# Patient Record
Sex: MALE | Race: ASIAN | NOT HISPANIC OR LATINO | Employment: FULL TIME | URBAN - METROPOLITAN AREA
[De-identification: names, ages, dates, MRNs, and addresses within clinical notes are randomized per-mention and may not be internally consistent; named-entity substitution may affect disease eponyms.]

---

## 2017-10-26 ENCOUNTER — OFFICE VISIT (OUTPATIENT)
Dept: PEDIATRICS | Facility: CLINIC | Age: 42
End: 2017-10-26
Payer: COMMERCIAL

## 2017-10-26 VITALS
SYSTOLIC BLOOD PRESSURE: 110 MMHG | HEART RATE: 81 BPM | BODY MASS INDEX: 24.91 KG/M2 | OXYGEN SATURATION: 100 % | TEMPERATURE: 97.2 F | HEIGHT: 66 IN | WEIGHT: 155 LBS | DIASTOLIC BLOOD PRESSURE: 84 MMHG

## 2017-10-26 DIAGNOSIS — Z76.89 ENCOUNTER TO ESTABLISH CARE: Primary | ICD-10-CM

## 2017-10-26 DIAGNOSIS — Z13.220 LIPID SCREENING: ICD-10-CM

## 2017-10-26 DIAGNOSIS — Z23 NEEDS FLU SHOT: ICD-10-CM

## 2017-10-26 DIAGNOSIS — M54.2 CERVICALGIA: ICD-10-CM

## 2017-10-26 DIAGNOSIS — Z13.1 SCREENING FOR DIABETES MELLITUS: ICD-10-CM

## 2017-10-26 PROCEDURE — 90686 IIV4 VACC NO PRSV 0.5 ML IM: CPT | Performed by: INTERNAL MEDICINE

## 2017-10-26 PROCEDURE — 90471 IMMUNIZATION ADMIN: CPT | Performed by: INTERNAL MEDICINE

## 2017-10-26 PROCEDURE — 99202 OFFICE O/P NEW SF 15 MIN: CPT | Mod: 25 | Performed by: INTERNAL MEDICINE

## 2017-10-26 NOTE — PATIENT INSTRUCTIONS
Make appointment(s) for:   -- check with your insurance about coverage for physical, lab, physical therapy.   -- schedule for these appointments if they are covered benefits.   -- stop taking B12 supplement.  -- please bring your immunization record for review at your next appointment.

## 2017-10-26 NOTE — MR AVS SNAPSHOT
After Visit Summary   10/26/2017    Alvarado Spivey    MRN: [id number]           Patient Information     Date Of Birth          1975        Visit Information        Provider Department      10/26/2017 5:30 PM Ilia Rodriguez MD PhD [facility]        Today's Diagnoses     Encounter to establish care    -  1    Cervicalgia        Needs flu shot        Lipid screening        Screening for diabetes mellitus          Care Instructions    Make appointment(s) for:   -- check with your insurance about coverage for physical, lab, physical therapy.   -- schedule for these appointments if they are covered benefits.   -- stop taking B12 supplement.  -- please bring your immunization record for review at your next appointment.                 Follow-ups after your visit        Additional Services     [facility] PT, HAND, AND CHIROPRACTIC REFERRAL       **This order will print in the [facility] Scheduling Office**    Physical Therapy, Hand Therapy and Chiropractic Care are available through:    *[facility]  *[facility]  *[facility]    Call one number to schedule at any of the above locations: [phone].    Your provider has referred you to: Physical Therapy at [facility] or [facility]    Indication/Reason for Referral: Neck Pain  Onset of Illness: chronic for a few years.   Therapy Orders: Evaluate and Treat  Special Programs: None  Special Request: None    Simon Montes      Additional Comments for the Therapist or Chiropractor:     Please be aware that coverage of these services is subject to the terms and limitations of your health insurance plan.  Call member services at your health plan with any benefit or coverage questions.      Please bring the following to your appointment:    *Your personal calendar for scheduling future appointments  *Comfortable clothing                  Future tests that were ordered for you today     Open Future Orders         Priority Expected Expires Ordered    Lipid panel reflex to direct LDL Fasting Routine  2017 10/26/2017    Glucose Routine  2017 10/26/2017            Who to contact     If you have questions or need follow up information about today's clinic visit or your schedule please contact [facility] directly at [phone].  Normal or non-critical lab and imaging results will be communicated to you by MyChart, letter or phone within 4 business days after the clinic has received the results. If you do not hear from us within 7 days, please contact the clinic through [employer]hart or phone. If you have a critical or abnormal lab result, we will notify you by phone as soon as possible.  Submit refill requests through [employer] or call your pharmacy and they will forward the refill request to us. Please allow 3 business days for your refill to be completed.          Additional Information About Your Visit        My[employer]Screen Information     [employer]Screen is an electronic gateway that provides easy, online access to your medical records. With My[employer]Screen, you can request a clinic appointment, read your test results, renew a prescription or communicate with your care team.     To sign up for My[employer] visit the website at [email or web address]   You will be asked to enter the access code listed below, as well as some personal information. Please follow the directions to create your username and password.     Your access code is: [email or web address]  Expires: 2018  6:47 PM     Your access code will  in 90 days. If you need help or a new code, please contact your [facility] or call [phone] for assistance.        Care EveryWhere ID     This is your Care EveryWhere ID. This could be used by other organizations to access your [facility] medical records  [id number]        Your Vitals Were     Pulse Temperature Height Pulse Oximetry BMI (Body Mass Index)       81 97.2  F (36.2  C)  "(Temporal) 5' 6\" (1.676 m) 100% 25.02 kg/m2        Blood Pressure from Last 3 Encounters:   10/26/17 110/84    Weight from Last 3 Encounters:   10/26/17 155 lb (70.3 kg)              We Performed the Following     HC FLU VAC PRESRV FREE QUAD SPLIT VIR 3+YRS IM     [facility] PT, HAND, AND CHIROPRACTIC REFERRAL        Primary Care Provider Office Phone # Fax #    Ilia Rodriguez MD PhD [phone]       [address]  [facility] [address]        Equal Access to Services     [facility]: Hadii aad ku hadasho Soomaali, waaxda luqadaha, qaybta kaalmada adeegyada, shannon jauregui . So [facility] [phone].    ATENCIÓN: Si habla español, tiene a carter disposición servicios gratuitos de asistencia lingüística. Llame al [phone].    We comply with applicable federal civil rights laws and [place] laws. We do not discriminate on the basis of race, color, national origin, age, disability, sex, sexual orientation, or gender identity.            Thank you!     Thank you for choosing [facility]  for your care. Our goal is always to provide you with excellent care. Hearing back from our patients is one way we can continue to improve our services. Please take a few minutes to complete the written survey that you may receive in the mail after your visit with us. Thank you!             Your Updated Medication List - Protect others around you: Learn how to safely use, store and throw away your medicines at www.disposemymeds.org.          This list is accurate as of: 10/26/17  6:47 PM.  Always use your most recent med list.                   Brand Name Dispense Instructions for use Diagnosis    CYANOCOBALAMIN PO      Take 1,000 mcg by mouth daily          "

## 2017-10-26 NOTE — PROGRESS NOTES
"  SUBJECTIVE:   Alvarado Spivey is a 42 year old male who presents to clinic today for the following health issues:      New Patient/Transfer of Care from the [facility], no previous primeary  Taking B12, is it safe to take?       He moved from the [place] to the  in 2016. Works as a  at [employer]. He reports being healthy no significant health history. 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 It gets better when he does not have to work in the restaurant. No injury to the neck.     He reports having had a series of immunization prior to immigrating to the . His wife was diagnosed with breast cancer in the [facility] and is getting treatment at our care system.     He takes B12 supplement, thought it would help his neck. Took it for 6 months, no benefit.       No current outpatient prescriptions on file prior to visit.  No current facility-administered medications on file prior to visit.       Problem list, Medication list, Allergies, and Medical/Social/Surgical histories reviewed in [facility] and updated as appropriate.    OBJECTIVE:                                                    /84  Pulse 81  Temp 97.2  F (36.2  C) (Temporal)  Ht 5' 6\" (1.676 m)  Wt 155 lb (70.3 kg)  SpO2 100%  BMI 25.02 kg/m2    GEN: healthy, alert and no distress  Neck: mild generalized discomfort. No specific spinous tenderness.      ASSESSMENT/PLAN:                                                      42 year old male with the following diagnoses and treatment plan:      ICD-10-CM    1. Encounter to establish care Z76.89    2. Cervicalgia M54.2 [facility] HAND, AND CHIROPRACTIC REFERRAL   3. Needs flu shot Z23 HC FLU VAC PRESRV FREE QUAD SPLIT VIR 3+YRS IM   4. Lipid screening Z13.220 Lipid panel reflex to direct LDL Fasting   5. Screening for diabetes mellitus Z13.1 Glucose       -- cervicalgia " likely postural related. PT for stretching exercise.  -- preventive labs ordered. Stop B12 supplement.   -- pt wants to check insurance about PT, lab, or physical exam coverage before scheduling appointment. Will bring immunization records for review.     Will call or return to clinic if worsening or symptoms not improving as discussed.  See Patient Instructions.        Ilia Rodriguez MD-PhD  [facility]    (Note: Chart documentation was done in part with Dragon Voice Recognition software. Although reviewed after completion, some word and grammatical errors may remain.)

## 2017-10-26 NOTE — NURSING NOTE
"Chief Complaint   Patient presents with     Establish Care       Initial /84  Pulse 81  Temp 97.2  F (36.2  C) (Temporal)  Ht 5' 6\" (1.676 m)  Wt 155 lb (70.3 kg)  SpO2 100%  BMI 25.02 kg/m2 Estimated body mass index is 25.02 kg/(m^2) as calculated from the following:    Height as of this encounter: 5' 6\" (1.676 m).    Weight as of this encounter: 155 lb (70.3 kg).  Medication Reconciliation: complete    "

## 2017-10-27 NOTE — NURSING NOTE
Injectable Influenza Immunization Documentation    1.  Is the person to be vaccinated sick today?  No    2. Does the person to be vaccinated have an allergy to eggs or to a component of the vaccine?  No    3. Has the person to be vaccinated today ever had a serious reaction to influenza vaccine in the past?  No    4. Has the person to be vaccinated ever had Guillain-[place] syndrome?  No     Form completed by Christie MUÑIZ

## 2019-07-29 ENCOUNTER — ANCILLARY PROCEDURE (OUTPATIENT)
Dept: GENERAL RADIOLOGY | Facility: CLINIC | Age: 44
End: 2019-07-29
Attending: FAMILY MEDICINE
Payer: COMMERCIAL

## 2019-07-29 ENCOUNTER — OFFICE VISIT (OUTPATIENT)
Dept: PEDIATRICS | Facility: CLINIC | Age: 44
End: 2019-07-29
Payer: COMMERCIAL

## 2019-07-29 VITALS
HEART RATE: 93 BPM | DIASTOLIC BLOOD PRESSURE: 85 MMHG | OXYGEN SATURATION: 99 % | SYSTOLIC BLOOD PRESSURE: 126 MMHG | TEMPERATURE: 98.7 F | HEIGHT: 66 IN | WEIGHT: 158.5 LBS | BODY MASS INDEX: 25.47 KG/M2

## 2019-07-29 DIAGNOSIS — M62.838 NECK MUSCLE SPASM: ICD-10-CM

## 2019-07-29 DIAGNOSIS — M25.529 PAIN IN JOINT INVOLVING UPPER ARM, UNSPECIFIED LATERALITY: ICD-10-CM

## 2019-07-29 DIAGNOSIS — M62.838 NECK MUSCLE SPASM: Primary | ICD-10-CM

## 2019-07-29 PROCEDURE — 99213 OFFICE O/P EST LOW 20 MIN: CPT | Performed by: FAMILY MEDICINE

## 2019-07-29 PROCEDURE — 72040 X-RAY EXAM NECK SPINE 2-3 VW: CPT | Performed by: RADIOLOGY

## 2019-07-29 RX ORDER — CYCLOBENZAPRINE HCL 10 MG
10 TABLET ORAL
Qty: 20 TABLET | Refills: 0 | Status: SHIPPED | OUTPATIENT
Start: 2019-07-29 | End: 2024-05-10

## 2019-07-29 RX ORDER — NAPROXEN 500 MG/1
500 TABLET ORAL 2 TIMES DAILY PRN
Qty: 20 TABLET | Refills: 0 | Status: SHIPPED | OUTPATIENT
Start: 2019-07-29 | End: 2019-10-11

## 2019-07-29 ASSESSMENT — MIFFLIN-ST. JEOR: SCORE: 1556.7

## 2019-07-29 NOTE — PROGRESS NOTES
"Subjective     Alvarado Spivey is a 44 year old male who presents to clinic today for the following health issues:    HPI   Musculoskeletal problem/pain      Duration: 2wks    Description  Location: left elbow pain that radiates up arm. Also reports having neck pain over past week    Intensity:  severe    Accompanying signs and symptoms: none    History  Previous similar problem: no   Previous evaluation:  none    Precipitating or alleviating factors:  Trauma or overuse: no   Aggravating factors include: none    Therapies tried and outcome: nothing    Works as a WHATT at [employer]    Reviewed and updated as needed this visit by Provider  Tobacco  Allergies  Meds  Problems  Med Hx  Surg Hx  Fam Hx         Review of Systems   ROS COMP: Constitutional, HEENT, cardiovascular, pulmonary, gi and gu systems are negative, except as otherwise noted.      Objective    /85   Pulse 93   Temp 98.7  F (37.1  C)   Ht 1.676 m (5' 6\")   Wt 71.9 kg (158 lb 8 oz)   SpO2 99%   BMI 25.58 kg/m    Body mass index is 25.58 kg/m .  Physical Exam   Musculoskeletal:        Cervical back: He exhibits tenderness, bony tenderness, pain and spasm. He exhibits normal range of motion, no swelling, no edema, no deformity, no laceration and normal pulse.        Right upper arm: Normal.        Left upper arm: He exhibits no tenderness, no bony tenderness, no swelling, no edema, no deformity and no laceration.        Right forearm: Normal.        Left forearm: He exhibits tenderness and bony tenderness. He exhibits no swelling, no edema, no deformity and no laceration.                  Assessment & Plan     1. Neck muscle spasm  I am suspecting he has cervical radiculopathy  Will schedule him for physical therapy  MRI to see degree if symptoms are still persistent despite physical therapy  - XR Cervical Spine 2/3 Views; Future  - cyclobenzaprine (FLEXERIL) 10 MG tablet; Take 1 tablet (10 mg) by mouth nightly as needed for " muscle spasms  Dispense: 20 tablet; Refill: 0  - naproxen (NAPROSYN) 500 MG tablet; Take 1 tablet (500 mg) by mouth 2 times daily as needed for moderate pain  Dispense: 20 tablet; Refill: 0  - PHYSICAL THERAPY REFERRAL; Future    2. Pain in joint involving upper arm, unspecified laterality  - XR Cervical Spine 2/3 Views; Future  - cyclobenzaprine (FLEXERIL) 10 MG tablet; Take 1 tablet (10 mg) by mouth nightly as needed for muscle spasms  Dispense: 20 tablet; Refill: 0  - naproxen (NAPROSYN) 500 MG tablet; Take 1 tablet (500 mg) by mouth 2 times daily as needed for moderate pain  Dispense: 20 tablet; Refill: 0  - PHYSICAL THERAPY REFERRAL; Future         Return in about 3 weeks (around 8/19/2019) for PCP follow up symptoms.    Pooja Portillo MD  [facility]

## 2019-07-30 ENCOUNTER — TELEPHONE (OUTPATIENT)
Dept: PEDIATRICS | Facility: CLINIC | Age: 44
End: 2019-07-30

## 2019-07-30 NOTE — TELEPHONE ENCOUNTER
Pt notified per dr. danyelle PETERSEN, advised pain management and PT as instructed at appt yesterday. Pt will call to sched PT appt. abdulaziz singh lpn

## 2019-07-30 NOTE — TELEPHONE ENCOUNTER
DJD to c7-T1, continue with plan discussed during appointment. Pain management and physical therapy.

## 2019-08-08 ENCOUNTER — THERAPY VISIT (OUTPATIENT)
Dept: PHYSICAL THERAPY | Facility: CLINIC | Age: 44
End: 2019-08-08
Attending: FAMILY MEDICINE
Payer: COMMERCIAL

## 2019-08-08 DIAGNOSIS — M25.529 PAIN IN JOINT INVOLVING UPPER ARM, UNSPECIFIED LATERALITY: ICD-10-CM

## 2019-08-08 DIAGNOSIS — M62.838 NECK MUSCLE SPASM: ICD-10-CM

## 2019-08-08 PROCEDURE — 97140 MANUAL THERAPY 1/> REGIONS: CPT | Mod: GP | Performed by: PHYSICAL THERAPIST

## 2019-08-08 PROCEDURE — 97161 PT EVAL LOW COMPLEX 20 MIN: CPT | Mod: GP | Performed by: PHYSICAL THERAPIST

## 2019-08-12 ENCOUNTER — THERAPY VISIT (OUTPATIENT)
Dept: PHYSICAL THERAPY | Facility: CLINIC | Age: 44
End: 2019-08-12
Payer: COMMERCIAL

## 2019-08-12 DIAGNOSIS — M54.12 CERVICAL RADICULOPATHY: ICD-10-CM

## 2019-08-12 PROCEDURE — 97140 MANUAL THERAPY 1/> REGIONS: CPT | Mod: GP | Performed by: PHYSICAL THERAPIST

## 2019-08-12 PROCEDURE — 97110 THERAPEUTIC EXERCISES: CPT | Mod: GP | Performed by: PHYSICAL THERAPIST

## 2019-10-09 ENCOUNTER — TELEPHONE (OUTPATIENT)
Dept: PEDIATRICS | Facility: CLINIC | Age: 44
End: 2019-10-09

## 2019-10-09 DIAGNOSIS — M25.529 PAIN IN JOINT INVOLVING UPPER ARM, UNSPECIFIED LATERALITY: ICD-10-CM

## 2019-10-09 DIAGNOSIS — M62.838 NECK MUSCLE SPASM: ICD-10-CM

## 2019-10-10 PROBLEM — M54.12 CERVICAL RADICULOPATHY: Status: RESOLVED | Noted: 2019-08-12 | Resolved: 2019-10-10

## 2019-10-10 RX ORDER — NAPROXEN 500 MG/1
TABLET ORAL
Qty: 20 TABLET | Refills: 0 | OUTPATIENT
Start: 2019-10-10

## 2019-10-10 NOTE — TELEPHONE ENCOUNTER
"Requested Prescriptions   Pending Prescriptions Disp Refills     naproxen (NAPROSYN) 500 MG tablet [Pharmacy Med Name: NAPROXEN 500MG      TAB] 20 tablet 0     Sig: TAKE 1 TABLET BY MOUTH TWICE DAILY AS NEEDED FOR  MODERATE  PAIN.       NSAID Medications Failed - 10/9/2019  7:36 PM        Failed - Normal ALT on file in past 12 months     No lab results found.          Failed - Normal AST on file in past 12 months     No lab results found.          Failed - Normal CBC on file in past 12 months     No lab results found.              Failed - Normal serum creatinine on file in past 12 months     No lab results found.          Passed - Blood pressure under 140/90 in past 12 months     BP Readings from Last 3 Encounters:   07/29/19 126/85   10/26/17 110/84                 Passed - Recent (12 mo) or future (30 days) visit within the authorizing provider's specialty     Patient has had an office visit with the authorizing provider or a provider within the authorizing providers department within the previous 12 mos or has a future within next 30 days. See \"Patient Info\" tab in inbasket, or \"Choose Columns\" in Meds & Orders section of the refill encounter.              Passed - Patient is age 6-64 years        Passed - Medication is active on med list          "

## 2019-10-10 NOTE — PROGRESS NOTES
DISCHARGE SUMMARY    Alvarado Spivey was seen 2 times for evaluation and treatment.  Patient did not return for further treatment and current status is unknown.  Due to short treatment duration, no objective or functional changes were made.  Please see goal flow sheet from episode noted date below and initial evaluation for further information.  Patient is discharged from therapy and therapy episode is resolved as of 10/10/19.      Linked Episodes   Type: Episode: Status: Noted: Resolved: Last update: Updated by:   PHYSICAL THERAPY cervical 8/8/19 Active 8/8/2019 8/12/2019  4:38 PM Trixie Patiño, PT      Comments:

## 2019-10-10 NOTE — TELEPHONE ENCOUNTER
VM is full. Sent message to pharmacy. 7/29 OV says to f/u in 3 weeks for neck & arm pain.   Vickie Bruce RN

## 2019-10-10 NOTE — TELEPHONE ENCOUNTER
Patient no showed her visit with me.  I have not seen her for 2 years. Please follow up with me or Dr. Portillo.

## 2019-10-11 RX ORDER — NAPROXEN 500 MG/1
500 TABLET ORAL 2 TIMES DAILY PRN
Qty: 20 TABLET | Refills: 0 | Status: SHIPPED | OUTPATIENT
Start: 2019-10-11 | End: 2024-05-10

## 2019-10-11 NOTE — TELEPHONE ENCOUNTER
Patient states arm and neck pain is intermittent- mainly after lifting (he moved in August). He will take Advil if/until he can get Naproxen.    Scheduled 10/19.  Vickie Bruce RN

## 2022-02-01 ENCOUNTER — NURSE TRIAGE (OUTPATIENT)
Dept: FAMILY MEDICINE | Facility: CLINIC | Age: 47
End: 2022-02-01
Payer: COMMERCIAL

## 2022-02-01 NOTE — TELEPHONE ENCOUNTER
"Patient called, he wanted to schedule an appointment with Dr. Pisano, this is who his wife sees.  He wanted to have a physical and also discuss  his neck, shoulder pain that has been going on  For the past 8 months.  The patient states that he had Physical Therapy and felt better for a long time, nearly a year.  He states that for past 8 months, he has been dealing with this pain again.        For the past month, he has been having intermittent chest pain.  He describes it a \"little\" chest pain but rated it 7/10.    Asked if he was short of breath or have chest pressure, he said no SOB but felt light pressure, laughed and said \" like a dog\" sitting on there.  He states that his pain occurred after he had lunch today.  He notes that he used to smoke, for about 17 years, he is not sure but believes his father had heart disease, he is alive still.  The patient believes that the pain starts in his shoulder and works it's way down to the left side of his chest.    Let patient know, that the protocol states to call EMS, but the patient laughed and said that it's not that bad, he's heading home to work, he states, \"not today, thank you\"  Discussed reasoning with not ignoring symptoms, he declines help and will speak to his wife about it.    Reason for Disposition    Chest pain lasting longer than 5 minutes and ANY of the following:* Over 45 years old* Over 30 years old and at least one cardiac risk factor (e.g., diabetes, high blood pressure, high cholesterol, smoker, or strong family history of heart disease)* History of heart disease (i.e., angina, heart attack, heart failure, bypass surgery, takes nitroglycerin)* Pain is crushing, pressure-like, or heavy    Additional Information    Negative: Severe difficulty breathing (e.g., struggling for each breath, speaks in single words)    Negative: Passed out (i.e., fainted, collapsed and was not responding)    Negative: Difficult to awaken or acting confused (e.g., disoriented, " "slurred speech)    Negative: Shock suspected (e.g., cold/pale/clammy skin, too weak to stand, low BP, rapid pulse)    Answer Assessment - Initial Assessment Questions  1. LOCATION: \"Where does it hurt?\"        Left chest  2. RADIATION: \"Does the pain go anywhere else?\" (e.g., into neck, jaw, arms, back)      Neck pain, to shoulder pain, shoulder pain alternates and sometimes both of the pain.  3. ONSET: \"When did the chest pain begin?\" (Minutes, hours or days)       2 hours, after lunch   4. PATTERN \"Does the pain come and go, or has it been constant since it started?\"  \"Does it get worse with exertion?\"       Intermittent, comes and goes, does not get worse with exertion  5. DURATION: \"How long does it last\" (e.g., seconds, minutes, hours)      Hours per episode   6. SEVERITY: \"How bad is the pain?\"  (e.g., Scale 1-10; mild, moderate, or severe)     - MILD (1-3): doesn't interfere with normal activities      - MODERATE (4-7): interferes with normal activities or awakens from sleep     - SEVERE (8-10): excruciating pain, unable to do any normal activities         7/10, does not wake him up in the morning,  He wakes up in the mornings after sleeping  7. CARDIAC RISK FACTORS: \"Do you have any history of heart problems or risk factors for heart disease?\" (e.g., angina, prior heart attack; diabetes, high blood pressure, high cholesterol, smoker, or strong family history of heart disease)      Father did have history of heart disease, quit smoking 7 years ago  8. PULMONARY RISK FACTORS: \"Do you have any history of lung disease?\"  (e.g., blood clots in lung, asthma, emphysema, birth control pills)      No other pulmonary risk factors  9. CAUSE: \"What do you think is causing the chest pain?\"      Feels it is related to shoulder and neck pain  10. OTHER SYMPTOMS: \"Do you have any other symptoms?\" (e.g., dizziness, nausea, vomiting, sweating, fever, difficulty breathing, cough)        none  11. PREGNANCY: \"Is there any " "chance you are pregnant?\" \"When was your last menstrual period?\"        N/a    Protocols used: CHEST PAIN-A-OH    Helene Maguire RN, [facility]    "

## 2022-02-02 NOTE — TELEPHONE ENCOUNTER
Attempted to contact patient regarding yesterday's triage to see how he is feeling today? I do not see that he went in at all yesterday.    Samara Thomas RN [facility]

## 2022-02-03 NOTE — TELEPHONE ENCOUNTER
Attempted to contact patient to check on patient, mailbox is full.      See patient scheduled:     Next 5 appointments (look out 90 days)    Feb 22, 2022  1:40 PM  (Arrive by 1:20 PM)  Provider Visit with Su Pisano MD  [facility] ([facility] ) [address] [place] [id number]  [phone]        Helene Maguire RN, [facility]

## 2022-02-22 ENCOUNTER — OFFICE VISIT (OUTPATIENT)
Dept: FAMILY MEDICINE | Facility: CLINIC | Age: 47
End: 2022-02-22
Payer: COMMERCIAL

## 2022-02-22 VITALS
HEART RATE: 88 BPM | TEMPERATURE: 98.5 F | HEIGHT: 66 IN | SYSTOLIC BLOOD PRESSURE: 124 MMHG | DIASTOLIC BLOOD PRESSURE: 82 MMHG | RESPIRATION RATE: 16 BRPM | WEIGHT: 166.1 LBS | OXYGEN SATURATION: 100 % | BODY MASS INDEX: 26.69 KG/M2

## 2022-02-22 DIAGNOSIS — Z13.29 SCREENING FOR THYROID DISORDER: ICD-10-CM

## 2022-02-22 DIAGNOSIS — M62.838 NECK MUSCLE SPASM: ICD-10-CM

## 2022-02-22 DIAGNOSIS — Z11.4 SCREENING FOR HUMAN IMMUNODEFICIENCY VIRUS: ICD-10-CM

## 2022-02-22 DIAGNOSIS — Z13.220 SCREENING FOR HYPERLIPIDEMIA: ICD-10-CM

## 2022-02-22 DIAGNOSIS — M54.2 CHRONIC NECK PAIN: Primary | ICD-10-CM

## 2022-02-22 DIAGNOSIS — Z12.11 SCREEN FOR COLON CANCER: ICD-10-CM

## 2022-02-22 DIAGNOSIS — Z13.0 SCREENING FOR DEFICIENCY ANEMIA: ICD-10-CM

## 2022-02-22 DIAGNOSIS — Z13.1 SCREENING FOR DIABETES MELLITUS (DM): ICD-10-CM

## 2022-02-22 DIAGNOSIS — G89.29 CHRONIC NECK PAIN: Primary | ICD-10-CM

## 2022-02-22 DIAGNOSIS — Z11.59 NEED FOR HEPATITIS C SCREENING TEST: ICD-10-CM

## 2022-02-22 PROCEDURE — 99213 OFFICE O/P EST LOW 20 MIN: CPT | Performed by: FAMILY MEDICINE

## 2022-02-22 RX ORDER — METAXALONE 800 MG/1
800 TABLET ORAL 2 TIMES DAILY PRN
Qty: 30 TABLET | Refills: 0 | Status: SHIPPED | OUTPATIENT
Start: 2022-02-22 | End: 2024-05-10

## 2022-02-22 RX ORDER — DICLOFENAC SODIUM 75 MG/1
75 TABLET, DELAYED RELEASE ORAL 2 TIMES DAILY PRN
Qty: 30 TABLET | Refills: 0 | Status: SHIPPED | OUTPATIENT
Start: 2022-02-22 | End: 2024-05-10

## 2022-02-22 ASSESSMENT — PAIN SCALES - GENERAL: PAINLEVEL: SEVERE PAIN (6)

## 2022-02-22 NOTE — PROGRESS NOTES
Assessment & Plan     Chronic neck pain  Reviewed and neck spine x-ray from 7/29/2019 showing- Findings:   3 views of cervical spine. The cervicothoracic junction is adequately  evaluated. Minor spurring of the lower disc spaces. Degenerative  facets at C7-T1. Odontoid well seated between the lateral masses. No  significant uncovertebral joint degeneration.    Emphasized on following good posture  Education handouts given on isometric exercises for neck muscles  Prescription given for diclofenac 75 mg to take twice a day as needed for neck pain and Skelaxin to take twice a day as needed for neck muscle spasm  If no improvement noted in 5 to 6 weeks, will consider cervical spine MRI   Dosing and potential medication side effects discussed.  Patient verbalised understanding and is agreeable to the plan.       - diclofenac (VOLTAREN) 75 MG EC tablet; Take 1 tablet (75 mg) by mouth 2 times daily as needed for moderate pain  - metaxalone (SKELAXIN) 800 MG tablet; Take 1 tablet (800 mg) by mouth 2 times daily as needed for moderate pain      Neck muscle spasm  as above    - metaxalone (SKELAXIN) 800 MG tablet; Take 1 tablet (800 mg) by mouth 2 times daily as needed for moderate pain    Screen for colon cancer    - Adult Gastro Ref - Procedure Only; Future    Need for hepatitis C screening test    - Hepatitis C antibody; Future    Screening for hyperlipidemia    - Lipid panel reflex to direct LDL Fasting; Future      Screening for deficiency anemia    - CBC with platelets; Future    Screening for diabetes mellitus (DM)    - Comprehensive metabolic panel (BMP + Alb, Alk Phos, ALT, AST, Total. Bili, TP); Future    Screening for thyroid disorder    - TSH with free T4 reflex; Future    Screening for human immunodeficiency virus    - HIV Antigen Antibody Combo; Future    Review of the result(s) of each unique test - c-spine xray from 2019         BMI:   Estimated body mass index is 26.81 kg/m  as calculated from the  "following:    Height as of this encounter: 1.676 m (5' 6\").    Weight as of this encounter: 75.3 kg (166 lb 1.6 oz).       Chart documentation done in part with Dragon Voice recognition Software. Although reviewed after completion, some word and grammatical error may remain.    See Patient Instructions    Return in about 2 months (around 4/22/2022), or if symptoms worsen or fail to improve, for Physical Exam, fasting labs.    Su Pisano MD  [facility]    Kiet Childers is a 46 year old who presents for the following health issues   Patient is new to the provider, is here today with concerns of having ongoing intermittent episodes of pain in the neck radiating to both shoulder blades, with no associated symptoms of chronic headaches, tingling, numbness or weakness of the upper extremities, abnormal skin rashes  Denies new onset of bladder or bowel incontinence  Symptom duration-for a few years now  More frequent in the past few months  Denies history of neck trauma  Was seen by Dr. Rodriguez in 2019, had cervical spine x-rays done at the time, symptoms resolved with the help of physical therapy.  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 Patient's back pain is a recurring problem.  Location of back pain:  Right side of neck, left side of neck, right shoulder and left shoulder  Description of back pain: cramping  Back pain spreads: right shoulder and left shoulder    Since patient first noticed back pain, pain is: unchanged  Does back pain interfere with his job:  Yes      He eats 2-3 servings of fruits and vegetables daily.He consumes 0 sweetened beverage(s) daily.He exercises with enough effort to increase his heart rate 10 to 19 minutes per day.  He exercises with enough effort to increase his heart rate 6 days per week.   He is taking medications regularly.   " "    Concern - Shoulder and neck pain  Onset:  3 to 4 months   Description: come and go - achy   Intensity: mild, moderate  Progression of Symptoms:  constant and intermittent  Accompanying Signs & Symptoms: heart palp, head ache once  Previous history of similar problem: Joint pain - come and go   Precipitating factors:        Worsened by: walking, related to work, awaken  Alleviating factors:        Improved by: Tylenol   Therapies tried and outcome: None        Review of Systems   CONSTITUTIONAL: NEGATIVE for fever, chills, change in weight  RESP: NEGATIVE for significant cough or SOB  CV: NEGATIVE for chest pain, palpitations or peripheral edema  MUSCULOSKELETAL: as above  NEURO: NEGATIVE for weakness, dizziness or paresthesias  PSYCHIATRIC: NEGATIVE for changes in mood or affect      Objective    /82 (BP Location: Right arm, Patient Position: Standing, Cuff Size: Adult Regular)   Pulse 88   Temp 98.5  F (36.9  C) (Oral)   Resp 16   Ht 1.676 m (5' 6\")   Wt 75.3 kg (166 lb 1.6 oz)   SpO2 100%   BMI 26.81 kg/m    Body mass index is 26.81 kg/m .  Physical Exam   GENERAL: healthy, alert and no distress  MS: No cervical spine tenderness, slightly restricted range of motion secondary to trapezius muscle spasm, moderate tenderness over both trapezius muscles  SKIN: no suspicious lesions or rashes  NEURO: Normal strength and tone, mentation intact and speech normal  PSYCH: mentation appears normal, affect normal/bright              "

## 2022-02-22 NOTE — PATIENT INSTRUCTIONS
Patient Education     Neck Exercises: Neck Isometrics  To start, sit in a chair with your feet flat on the floor. Your weight should be slightly forward so that you re balanced evenly on your buttocks. Relax your shoulders and keep your head level. Using a chair with arms may help you keep your balance.       1. Press your palm against your forehead. Resist with your neck muscles. Hold for  10 seconds. Relax. Repeat 5 times.  2. Do the exercise again, pressing on the side of your head. Repeat  5 times. Switch sides.  3. Do the exercise again, pressing on the back of your head. Repeat  5 times.  For your safety, check with your healthcare provider before starting an exercise program.  [employer] last reviewed this educational content on 7/1/2020 2000-2021 [employer]. All rights reserved. This information is not intended as a substitute for professional medical care. Always follow your healthcare professional's instructions.           Patient Education     Shoulder External Rotation, Isometric (Strength)    4. Bend your right arm in front of your body, palm up. Hold your right wrist with your left hand.  5. Try to push your right arm outward, while pulling back with your left arm. Try not to let either arm move. Push and pull both arms firmly in opposite directions.  6. Hold for 5 seconds. Then relax.  7. Repeat 5 times.  8. Switch sides and repeat if instructed.  9. Repeat this exercise 3 times a day, or as instructed.  [employer] last reviewed this educational content on 8/1/2019 2000-2021 [employer]. All rights reserved. This information is not intended as a substitute for professional medical care. Always follow your healthcare professional's instructions.

## 2022-03-26 ENCOUNTER — HEALTH MAINTENANCE LETTER (OUTPATIENT)
Age: 47
End: 2022-03-26

## 2022-04-20 ENCOUNTER — LAB (OUTPATIENT)
Dept: LAB | Facility: CLINIC | Age: 47
End: 2022-04-20
Payer: COMMERCIAL

## 2022-04-20 DIAGNOSIS — Z13.220 SCREENING FOR HYPERLIPIDEMIA: ICD-10-CM

## 2022-04-20 DIAGNOSIS — Z13.0 SCREENING FOR DEFICIENCY ANEMIA: ICD-10-CM

## 2022-04-20 DIAGNOSIS — Z11.59 NEED FOR HEPATITIS C SCREENING TEST: ICD-10-CM

## 2022-04-20 DIAGNOSIS — Z13.29 SCREENING FOR THYROID DISORDER: ICD-10-CM

## 2022-04-20 DIAGNOSIS — Z11.4 SCREENING FOR HUMAN IMMUNODEFICIENCY VIRUS: ICD-10-CM

## 2022-04-20 DIAGNOSIS — Z13.1 SCREENING FOR DIABETES MELLITUS (DM): ICD-10-CM

## 2022-04-20 DIAGNOSIS — R73.01 ELEVATED FASTING GLUCOSE: Primary | ICD-10-CM

## 2022-04-20 LAB
ALBUMIN SERPL-MCNC: 4.2 G/DL (ref 3.4–5)
ALP SERPL-CCNC: 57 U/L (ref 40–150)
ALT SERPL W P-5'-P-CCNC: 47 U/L (ref 0–70)
ANION GAP SERPL CALCULATED.3IONS-SCNC: 5 MMOL/L (ref 3–14)
AST SERPL W P-5'-P-CCNC: 14 U/L (ref 0–45)
BILIRUB SERPL-MCNC: 0.6 MG/DL (ref 0.2–1.3)
BUN SERPL-MCNC: 15 MG/DL (ref 7–30)
CALCIUM SERPL-MCNC: 9.1 MG/DL (ref 8.5–10.1)
CHLORIDE BLD-SCNC: 103 MMOL/L (ref 94–109)
CHOLEST SERPL-MCNC: 184 MG/DL
CO2 SERPL-SCNC: 28 MMOL/L (ref 20–32)
CREAT SERPL-MCNC: 0.78 MG/DL (ref 0.66–1.25)
ERYTHROCYTE [DISTWIDTH] IN BLOOD BY AUTOMATED COUNT: 11.9 % (ref 10–15)
FASTING STATUS PATIENT QL REPORTED: YES
GFR SERPL CREATININE-BSD FRML MDRD: >90 ML/MIN/1.73M2
GLUCOSE BLD-MCNC: 110 MG/DL (ref 70–99)
HBA1C MFR BLD: 5.8 % (ref 0–5.6)
HCT VFR BLD AUTO: 46.9 % (ref 40–53)
HCV AB SERPL QL IA: NONREACTIVE
HDLC SERPL-MCNC: 43 MG/DL
HGB BLD-MCNC: 15.6 G/DL (ref 13.3–17.7)
HIV 1+2 AB+HIV1 P24 AG SERPL QL IA: NONREACTIVE
LDLC SERPL CALC-MCNC: 112 MG/DL
MCH RBC QN AUTO: 30.5 PG (ref 26.5–33)
MCHC RBC AUTO-ENTMCNC: 33.3 G/DL (ref 31.5–36.5)
MCV RBC AUTO: 92 FL (ref 78–100)
NONHDLC SERPL-MCNC: 141 MG/DL
PLATELET # BLD AUTO: 213 10E3/UL (ref 150–450)
POTASSIUM BLD-SCNC: 4.1 MMOL/L (ref 3.4–5.3)
PROT SERPL-MCNC: 8.4 G/DL (ref 6.8–8.8)
RBC # BLD AUTO: 5.11 10E6/UL (ref 4.4–5.9)
SODIUM SERPL-SCNC: 136 MMOL/L (ref 133–144)
TRIGL SERPL-MCNC: 147 MG/DL
TSH SERPL DL<=0.005 MIU/L-ACNC: 0.94 MU/L (ref 0.4–4)
WBC # BLD AUTO: 6.6 10E3/UL (ref 4–11)

## 2022-04-20 PROCEDURE — 83036 HEMOGLOBIN GLYCOSYLATED A1C: CPT

## 2022-04-20 PROCEDURE — 86803 HEPATITIS C AB TEST: CPT

## 2022-04-20 PROCEDURE — 80061 LIPID PANEL: CPT

## 2022-04-20 PROCEDURE — 85027 COMPLETE CBC AUTOMATED: CPT

## 2022-04-20 PROCEDURE — 87389 HIV-1 AG W/HIV-1&-2 AB AG IA: CPT

## 2022-04-20 PROCEDURE — 80053 COMPREHEN METABOLIC PANEL: CPT

## 2022-04-20 PROCEDURE — 36415 COLL VENOUS BLD VENIPUNCTURE: CPT

## 2022-04-20 PROCEDURE — 84443 ASSAY THYROID STIM HORMONE: CPT

## 2022-04-22 ENCOUNTER — OFFICE VISIT (OUTPATIENT)
Dept: FAMILY MEDICINE | Facility: CLINIC | Age: 47
End: 2022-04-22
Payer: COMMERCIAL

## 2022-04-22 VITALS
TEMPERATURE: 97.9 F | HEART RATE: 95 BPM | RESPIRATION RATE: 15 BRPM | SYSTOLIC BLOOD PRESSURE: 134 MMHG | BODY MASS INDEX: 26.73 KG/M2 | OXYGEN SATURATION: 99 % | WEIGHT: 166.3 LBS | DIASTOLIC BLOOD PRESSURE: 87 MMHG | HEIGHT: 66 IN

## 2022-04-22 DIAGNOSIS — Z23 NEED FOR TDAP VACCINATION: ICD-10-CM

## 2022-04-22 DIAGNOSIS — Z12.11 SCREEN FOR COLON CANCER: ICD-10-CM

## 2022-04-22 DIAGNOSIS — R73.03 PREDIABETES: ICD-10-CM

## 2022-04-22 DIAGNOSIS — H53.8 BLURRED VISION: ICD-10-CM

## 2022-04-22 DIAGNOSIS — Z00.00 ROUTINE GENERAL MEDICAL EXAMINATION AT A HEALTH CARE FACILITY: Primary | ICD-10-CM

## 2022-04-22 PROCEDURE — 99396 PREV VISIT EST AGE 40-64: CPT | Performed by: FAMILY MEDICINE

## 2022-04-22 RX ORDER — ACETAMINOPHEN 500 MG
500-1000 TABLET ORAL EVERY 6 HOURS PRN
COMMUNITY

## 2022-04-22 ASSESSMENT — ENCOUNTER SYMPTOMS
SHORTNESS OF BREATH: 0
COUGH: 0
HEMATOCHEZIA: 0
PARESTHESIAS: 0
NAUSEA: 0
DIZZINESS: 0
ARTHRALGIAS: 0
DYSURIA: 0
EYE PAIN: 0
ABDOMINAL PAIN: 0
JOINT SWELLING: 0
FREQUENCY: 0
HEARTBURN: 0
SORE THROAT: 0
WEAKNESS: 0
CONSTIPATION: 0
FEVER: 0
PALPITATIONS: 0
CHILLS: 0
MYALGIAS: 0
HEADACHES: 0
NERVOUS/ANXIOUS: 0
HEMATURIA: 0
DIARRHEA: 0

## 2022-04-22 ASSESSMENT — PAIN SCALES - GENERAL: PAINLEVEL: NO PAIN (0)

## 2022-04-22 NOTE — PROGRESS NOTES
ldl  SUBJECTIVE:   CC: Alvarado Spivey is an 47 year old male who presents for preventative health visit.       Patient has been advised of split billing requirements and indicates understanding: Yes  Healthy Habits:     Getting at least 3 servings of Calcium per day:  Yes    Bi-annual eye exam:  NO    Dental care twice a year:  Yes    Sleep apnea or symptoms of sleep apnea:  None    Diet:  Low salt    Frequency of exercise:  2-3 days/week    Duration of exercise:  15-30 minutes    Taking medications regularly:  Yes    Medication side effects:  None    PHQ-2 Total Score: 0    Additional concerns today:  No              Today's PHQ-2 Score:   PHQ-2 ( 1999 Pfizer) 4/22/2022   Q1: Little interest or pleasure in doing things 0   Q2: Feeling down, depressed or hopeless 0   PHQ-2 Score 0   PHQ-2 Total Score (12-17 Years)- Positive if 3 or more points; Administer PHQ-A if positive -   Q1: Little interest or pleasure in doing things Not at all   Q2: Feeling down, depressed or hopeless Not at all   PHQ-2 Score 0       Abuse: Current or Past(Physical, Sexual or Emotional)- No  Do you feel safe in your environment? Yes    Have you ever done Advance Care Planning? (For example, a Health Directive, POLST, or a discussion with a medical provider or your loved ones about your wishes): no    Social History     Tobacco Use     Smoking status: Former Smoker     Smokeless tobacco: Never Used   Substance Use Topics     Alcohol use: Yes     If you drink alcohol do you typically have >3 drinks per day or >7 drinks per week? No    Alcohol Use 4/22/2022   Prescreen: >3 drinks/day or >7 drinks/week? No   No flowsheet data found.    Last PSA: No results found for: PSA    Reviewed orders with patient. Reviewed health maintenance and updated orders accordingly - Yes  Lab work is in process  Labs reviewed in EPIC  BP Readings from Last 3 Encounters:   04/22/22 134/87   02/22/22 124/82   07/29/19 126/85    Wt Readings from Last 3  Encounters:   04/22/22 75.4 kg (166 lb 4.8 oz)   02/22/22 75.3 kg (166 lb 1.6 oz)   07/29/19 71.9 kg (158 lb 8 oz)                  Patient Active Problem List   Diagnosis     Chronic neck pain     Screen for colon cancer     Neck muscle spasm     Prediabetes     History reviewed. No pertinent surgical history.    Social History     Tobacco Use     Smoking status: Former Smoker     Smokeless tobacco: Never Used   Substance Use Topics     Alcohol use: Yes     Family History   Problem Relation Age of Onset     Diabetes Maternal Grandmother      Diabetes Maternal Grandfather      Heart Disease Paternal Uncle      Diabetes Maternal Aunt          Current Outpatient Medications   Medication Sig Dispense Refill     acetaminophen (TYLENOL) 500 MG tablet Take 500-1,000 mg by mouth every 6 hours as needed for mild pain       CYANOCOBALAMIN PO Take 1,000 mcg by mouth daily (Patient not taking: No sig reported)       cyclobenzaprine (FLEXERIL) 10 MG tablet Take 1 tablet (10 mg) by mouth nightly as needed for muscle spasms (Patient not taking: No sig reported) 20 tablet 0     diclofenac (VOLTAREN) 75 MG EC tablet Take 1 tablet (75 mg) by mouth 2 times daily as needed for moderate pain (Patient not taking: Reported on 4/22/2022) 30 tablet 0     metaxalone (SKELAXIN) 800 MG tablet Take 1 tablet (800 mg) by mouth 2 times daily as needed for moderate pain (Patient not taking: Reported on 4/22/2022) 30 tablet 0     naproxen (NAPROSYN) 500 MG tablet Take 1 tablet (500 mg) by mouth 2 times daily as needed for moderate pain (Patient not taking: No sig reported) 20 tablet 0     No Known Allergies  Recent Labs   Lab Test 04/20/22  0905   A1C 5.8*   *   HDL 43   TRIG 147   ALT 47   CR 0.78   GFRESTIMATED >90   POTASSIUM 4.1   TSH 0.94        Reviewed and updated as needed this visit by clinical staff   Tobacco  Allergies  Meds   Med Hx  Surg Hx  Fam Hx  Soc Hx          Reviewed and updated as needed this visit by Provider                      History reviewed. No pertinent past medical history.   History reviewed. No pertinent surgical history.  OB History   No obstetric history on file.       Review of Systems   Constitutional: Negative for chills and fever.   HENT: Negative for congestion, ear pain, hearing loss and sore throat.    Eyes: Positive for visual disturbance. Negative for pain.   Respiratory: Negative for cough and shortness of breath.    Cardiovascular: Negative for chest pain, palpitations and peripheral edema.   Gastrointestinal: Negative for abdominal pain, constipation, diarrhea, heartburn, hematochezia and nausea.   Genitourinary: Negative for dysuria, frequency, genital sores, hematuria, impotence, penile discharge and urgency.   Musculoskeletal: Negative for arthralgias, joint swelling and myalgias.   Skin: Negative for rash.   Neurological: Negative for dizziness, weakness, headaches and paresthesias.   Psychiatric/Behavioral: Negative for mood changes. The patient is not nervous/anxious.      CONSTITUTIONAL: NEGATIVE for fever, chills, change in weight  INTEGUMENTARY/SKIN: NEGATIVE for worrisome rashes, moles or lesions  EYES: Ongoing blurred vision, using glasses, last eye exam was 3 years ago  ENT: NEGATIVE for ear, mouth and throat problems  RESP: NEGATIVE for significant cough or SOB  CV: NEGATIVE for chest pain, palpitations or peripheral edema  GI: NEGATIVE for nausea, abdominal pain, heartburn, or change in bowel habits   male: negative for dysuria, hematuria, decreased urinary stream, erectile dysfunction, urethral discharge  MUSCULOSKELETAL: NEGATIVE for significant arthralgias or myalgia  NEURO: NEGATIVE for weakness, dizziness or paresthesias  ENDOCRINE: NEGATIVE for temperature intolerance, skin/hair changes  HEME/ALLERGY/IMMUNE: NEGATIVE for bleeding problems  PSYCHIATRIC: NEGATIVE for changes in mood or affect    OBJECTIVE:   /87 (BP Location: Right arm, Patient Position: Sitting, Cuff Size:  "Adult Large)   Pulse 95   Temp 97.9  F (36.6  C) (Tympanic)   Resp 15   Ht 1.676 m (5' 6\")   Wt 75.4 kg (166 lb 4.8 oz)   SpO2 99%   BMI 26.84 kg/m      Physical Exam  GENERAL: healthy, alert and no distress  EYES: Eyes grossly normal to inspection, PERRL and conjunctivae and sclerae normal  HENT: ear canals and TM's normal, nose and mouth without ulcers or lesions  NECK: no adenopathy, no asymmetry, masses, or scars and thyroid normal to palpation  RESP: lungs clear to auscultation - no rales, rhonchi or wheezes  CV: regular rate and rhythm, normal S1 S2, no S3 or S4, no murmur, click or rub, no peripheral edema and peripheral pulses strong  ABDOMEN: soft, nontender, no hepatosplenomegaly, no masses and bowel sounds normal  MS: no gross musculoskeletal defects noted, no edema  SKIN: no suspicious lesions or rashes  NEURO: Normal strength and tone, mentation intact and speech normal  PSYCH: mentation appears normal, affect normal/bright    Diagnostic Test Results:  Labs reviewed in Epic    ASSESSMENT/PLAN:   (Z00.00) Routine general medical examination at a health care facility  (primary encounter diagnosis)  Comment:   Plan: : Discussed on regular exercises, healthy eating,  and routine dental checks.    (Z12.11) Screen for colon cancer  Comment:   Plan: Fecal colorectal cancer screen (FIT)        Patient declined colonoscopy    (R73.03) Prediabetes  Comment:   Plan:   Lab Results   Component Value Date    A1C 5.8 04/20/2022     Glucose   Date Value Ref Range Status   04/20/2022 110 (H) 70 - 99 mg/dL Final     Reviewed slightly elevated fasting blood sugar and A1c consistent with prediabetes  Reviewed healthy eating, regular exercises and weight loss patient reports eating avocado with condensed milk after dinner every day  Recommended to avoid using condensed milk on a daily basis, avoid simple sugars  Will recheck labs at the next year physical or sooner if needed    (Z23) Need for Tdap " "vaccination  Comment:   Plan: Patient will get us the date of last Tdap through [facility],  Since he thinks he had it less than 10 years ago    (H53.8) Blurred vision  Comment:   Plan: Adult Eye Referral        Last eye exam per patient's report was 3 years ago  He is currently using glasses  Recommended to schedule for eye exam at the [facility]          COUNSELING:   Reviewed preventive health counseling, as reflected in patient instructions  Special attention given to:        Regular exercise       Healthy diet/nutrition       Vision screening       Immunizations    Declined: TDAP due to Other ,patient wants to get the records of previous vaccination through [facility]            Estimated body mass index is 26.84 kg/m  as calculated from the following:    Height as of this encounter: 1.676 m (5' 6\").    Weight as of this encounter: 75.4 kg (166 lb 4.8 oz).     Weight management plan: Discussed healthy diet and exercise guidelines    He reports that he has quit smoking. He has never used smokeless tobacco.      Counseling Resources:  ATP IV Guidelines  Pooled Cohorts Equation Calculator  FRAX Risk Assessment  ICSI Preventive Guidelines  Dietary Guidelines for Americans, 2010  USDA's MyPlate  ASA Prophylaxis  Lung CA Screening    Su Pisano MD  [facility]  Chart documentation done in part with Dragon Voice recognition Software. Although reviewed after completion, some word and grammatical error may remain.    "

## 2022-04-22 NOTE — PROGRESS NOTES
SUBJECTIVE:   CC: Alvarado Spivey is an 47 year old woman who presents for preventive health visit.     {Split Bill scripting  The purpose of this visit is to discuss your medical history and prevent health problems before you are sick. You may be responsible for a co-pay, coinsurance, or deductible if your visit today includes services such as checking on a sore throat, having an x-ray or lab test, or treating and evaluating a new or existing condition :960848}  {Patient advised of split billing (Optional):058401}  Healthy Habits:     Getting at least 3 servings of Calcium per day:  Yes    Bi-annual eye exam:  NO    Dental care twice a year:  Yes    Sleep apnea or symptoms of sleep apnea:  None    Diet:  Low salt    Frequency of exercise:  2-3 days/week    Duration of exercise:  15-30 minutes    Taking medications regularly:  Yes    Medication side effects:  None    PHQ-2 Total Score: 0    Additional concerns today:  No    {Add if <65 person on Medicare  - Required Questions (Optional):628289}  {Outside tests to abstract? :710355}    {additional problems to add (Optional):482591}    Today's PHQ-2 Score:   PHQ-2 ( 1999 Pfizer) 4/22/2022   Q1: Little interest or pleasure in doing things 0   Q2: Feeling down, depressed or hopeless 0   PHQ-2 Score 0   PHQ-2 Total Score (12-17 Years)- Positive if 3 or more points; Administer PHQ-A if positive -   Q1: Little interest or pleasure in doing things Not at all   Q2: Feeling down, depressed or hopeless Not at all   PHQ-2 Score 0       Abuse: Current or Past (Physical, Sexual or Emotional) - { :892539}  Do you feel safe in your environment? { :821340}    Have you ever done Advance Care Planning? (For example, a Health Directive, POLST, or a discussion with a medical provider or your loved ones about your wishes): { :022315}    Social History     Tobacco Use     Smoking status: Former Smoker     Smokeless tobacco: Never Used   Substance Use Topics     Alcohol use: Yes  "    {Rooming Staff- Complete this question if Prescreen response is not shown below for today's visit. If you drink alcohol do you typically have >3 drinks per day or >7 drinks per week? (Optional):669195}    Alcohol Use 4/22/2022   Prescreen: >3 drinks/day or >7 drinks/week? No   {add AUDIT responses (Optional) (A score of 7 for adult men is an indication of hazardous drinking; a score of 8 or more is an indication of an alcohol use disorder.  A score of 7 or more for adult women is an indication of hazardous drinking or an alchohol use disorder):670305}    Reviewed orders with patient.  Reviewed health maintenance and updated orders accordingly - { :477584::\"Yes\"}  {Chronicprobdata (optional):336543}    Breast Cancer Screening:        History of abnormal Pap smear: { :193571}     Reviewed and updated as needed this visit by clinical staff                    Reviewed and updated as needed this visit by Provider                   {HISTORY OPTIONS (Optional):361686}    Review of Systems   Constitutional: Negative for chills and fever.   HENT: Negative for congestion, ear pain, hearing loss and sore throat.    Eyes: Positive for visual disturbance. Negative for pain.   Respiratory: Negative for cough and shortness of breath.    Cardiovascular: Negative for chest pain, palpitations and peripheral edema.   Gastrointestinal: Negative for abdominal pain, constipation, diarrhea, heartburn, hematochezia and nausea.   Genitourinary: Negative for dysuria, frequency, genital sores, hematuria, impotence, penile discharge and urgency.   Musculoskeletal: Negative for arthralgias, joint swelling and myalgias.   Skin: Negative for rash.   Neurological: Negative for dizziness, weakness, headaches and paresthesias.   Psychiatric/Behavioral: Negative for mood changes. The patient is not nervous/anxious.      {FEMALE ROS (Optional):967269}     OBJECTIVE:   There were no vitals taken for this visit.  Physical Exam  {Exam Choices " "(Optional):849900}    {Diagnostic Test Results (Optional):340374::\"Diagnostic Test Results:\",\"Labs reviewed in Epic\"}    ASSESSMENT/PLAN:   {Diag Picklist:597167}    {Patient advised of split billing (Optional):063646}    COUNSELING:  {FEMALE COUNSELING MESSAGES:586835::\"Reviewed preventive health counseling, as reflected in patient instructions\"}    Estimated body mass index is 26.81 kg/m  as calculated from the following:    Height as of 2/22/22: 1.676 m (5' 6\").    Weight as of 2/22/22: 75.3 kg (166 lb 1.6 oz).    {Weight Management Plan (ACO) Complete if BMI is abnormal-  Ages 18-64  BMI >24.9.  Age 65+ with BMI <23 or >30 (Optional):130562}    He reports that he has quit smoking. He has never used smokeless tobacco.      Counseling Resources:  ATP IV Guidelines  Pooled Cohorts Equation Calculator  Breast Cancer Risk Calculator  BRCA-Related Cancer Risk Assessment: FHS-7 Tool  FRAX Risk Assessment  ICSI Preventive Guidelines  Dietary Guidelines for Americans, 2010  [employer]'s MyPlate  ASA Prophylaxis  Lung CA Screening    Su Pisano MD  [facility]  "

## 2022-04-22 NOTE — PATIENT INSTRUCTIONS
Call  to schedule for eye exam    Preventive Health Recommendations  Male Ages 40 to 49    Yearly exam:             See your health care provider every year in order to  o   Review health changes.   o   Discuss preventive care.    o   Review your medicines if your doctor has prescribed any.  You should be tested each year for STDs (sexually transmitted diseases) if you re at risk.   Have a cholesterol test every 5 years.   Have a colonoscopy (test for colon cancer) if someone in your family has had colon cancer or polyps before age 50.   After age 45, have a diabetes test (fasting glucose). If you are at risk for diabetes, you should have this test every 3 years.    Talk with your health care provider about whether or not a prostate cancer screening test (PSA) is right for you.    Shots: Get a flu shot each year. Get a tetanus shot every 10 years.     Nutrition:  Eat at least 5 servings of fruits and vegetables daily.   Eat whole-grain bread, whole-wheat pasta and brown rice instead of white grains and rice.   Get adequate Calcium and Vitamin D.     Lifestyle  Exercise for at least 150 minutes a week (30 minutes a day, 5 days a week). This will help you control your weight and prevent disease.   Limit alcohol to one drink per day.   No smoking.   Wear sunscreen to prevent skin cancer.   See your dentist every six months for an exam and cleaning.

## 2022-04-26 PROCEDURE — 82274 ASSAY TEST FOR BLOOD FECAL: CPT | Performed by: FAMILY MEDICINE

## 2022-04-29 LAB — HEMOCCULT STL QL IA: NEGATIVE

## 2022-04-29 NOTE — RESULT ENCOUNTER NOTE
Talib Childers,   Your stool test results for colon cancer screening is negative, this is reassuring.   Let us recheck this next year.   Let me know if you have any questions. Take care.  Su Pisano MD

## 2022-09-17 ENCOUNTER — HEALTH MAINTENANCE LETTER (OUTPATIENT)
Age: 47
End: 2022-09-17

## 2022-12-05 NOTE — TELEPHONE ENCOUNTER
Initial Anesthesia Post-op Note    Patient: Ivonne Villeda  Procedure(s) Performed: LEFT ANKLE ORIF - LEFT  Anesthesia type: General    Vitals Value Taken Time   Temp 36.5 °C (97.7 °F) 12/05/22 1230   Pulse 61 12/05/22 1230   Resp 15 12/05/22 1230   SpO2 96 % 12/05/22 1230   /56 12/05/22 1230         Patient Location: PACU Phase 1  Post-op Vital Signs:stable  Level of Consciousness: awake, oriented and alert  Respiratory Status: spontaneous ventilation, unassisted and room air  Cardiovascular blood pressure returned to baseline and stable  Hydration: euvolemic  Pain Management: well controlled  Handoff: Handoff to receiving clinician was performed and questions were answered  Vomiting: none  Nausea: None  Airway Patency:patent  Post-op Assessment: no complications, patient tolerated procedure well with no complications and no evidence of recall  Comments: Pt stable and awake in PACU. Tolerated procedure well. No nausea. Pain is well controlled. Report given to RNWest BAIG.       There were no known complications for this encounter.   Nurses huddled regarding patient and decided to not attempt call for a 3rd day due to patient not being interested in the plan of care that was initially advised for the patient. Patient not taking our calls and has no voicemail available.     Samara Thomas RN [facility]

## 2023-03-10 ENCOUNTER — TELEPHONE (OUTPATIENT)
Dept: OPHTHALMOLOGY | Facility: CLINIC | Age: 48
End: 2023-03-10
Payer: COMMERCIAL

## 2023-03-10 NOTE — TELEPHONE ENCOUNTER
Spoke to pt at 1215    Pt states has tiny dark spot in vision noticed at times-- singular    No flashing    Distance vision stable    Pt states needing reading glasses update likely-- decrease reading vision    Pt was trying to schedule at  eye clinic and transferred to [place] triage line for floater complaint    Pt states trying to schedule at  location and prefers Friday's.    Reviewed will forward to  eye team to scheduling    Reviewed ok per my review for first available in lieu of any new floaters/flashing/vision changes between visits    -- pt aware was able to schedule next Friday at [place] location.    Pt verbally demonstrated understanding and will await call from  team for scheduling     Tawanda Baker, RN 1:23 PM 03/10/23

## 2023-03-10 NOTE — TELEPHONE ENCOUNTER
[facility] Call Center    Phone Message    May a detailed message be left on voicemail: no    Reason for Call: Symptoms or Concerns     If patient has red-flag symptoms, warm transfer to triage line    Current symptom or concern: floater in the right eye. Pt says that it comes and goes    Per protocol for floaters, please review and contact pt for scheduling options    Thank you     Symptoms have been present for:  2 week(s)    Has patient previously been seen for this? No    By : NA    Date: NA    Are there any new or worsening symptoms? No      Action Taken: Message routed to:  [facility] eye    Travel Screening: Not Applicable

## 2023-04-20 ENCOUNTER — PATIENT OUTREACH (OUTPATIENT)
Dept: CARE COORDINATION | Facility: CLINIC | Age: 48
End: 2023-04-20
Payer: COMMERCIAL

## 2023-06-04 ENCOUNTER — HEALTH MAINTENANCE LETTER (OUTPATIENT)
Age: 48
End: 2023-06-04

## 2023-08-14 ENCOUNTER — VIRTUAL VISIT (OUTPATIENT)
Dept: FAMILY MEDICINE | Facility: CLINIC | Age: 48
End: 2023-08-14
Payer: COMMERCIAL

## 2023-08-14 DIAGNOSIS — M72.2 PLANTAR FASCIITIS OF LEFT FOOT: ICD-10-CM

## 2023-08-14 DIAGNOSIS — M79.672 LEFT FOOT PAIN: Primary | ICD-10-CM

## 2023-08-14 PROCEDURE — 99214 OFFICE O/P EST MOD 30 MIN: CPT | Mod: VID | Performed by: NURSE PRACTITIONER

## 2023-08-14 RX ORDER — NAPROXEN 500 MG/1
500 TABLET ORAL 2 TIMES DAILY PRN
Qty: 30 TABLET | Refills: 1 | Status: SHIPPED | OUTPATIENT
Start: 2023-08-14 | End: 2024-05-10

## 2023-08-14 NOTE — PATIENT INSTRUCTIONS
PLAN:   1.   Symptomatic therapy suggested:   referral to Orthopedics for this injury, prescription for NSAID given    2.  Orders Placed This Encounter   Medications    naproxen (NAPROSYN) 500 MG tablet     Sig: Take 1 tablet (500 mg) by mouth 2 times daily as needed     Dispense:  30 tablet     Refill:  1     Orders Placed This Encounter   Procedures    REVIEW OF HEALTH MAINTENANCE PROTOCOL ORDERS    XR Foot Left G/E 3 Views    Orthopedic  Referral       3.  FURTHER TESTING:       - xray foot   Referral to podiatry    Patient needs to follow up in if no improvement,or sooner if worsening of symptoms or other symptoms develop.

## 2023-08-14 NOTE — PROGRESS NOTES
Alvarado is a 48 year old who is being evaluated via a billable video visit.      How would you like to obtain your AVS? Mail a copy and MychartSend to e-mail at: [email or web address]  If the video visit is dropped, the invitation should be resent by:   Will anyone else be joining your video visit? No      Subjective   Alvarado is a 48 year old, presenting for the following health issues:  Foot Pain (Left foot)      8/14/2023    10:24 AM   Additional Questions   Roomed by lul   Accompanied by self       History of Present Illness       Reason for visit:  Foot pain  Symptom onset:  More than a month  Symptoms include:  Dull achy feeling/ foot pain in the morning  Symptom intensity:  Moderate  Symptom progression:  Staying the same  Had these symptoms before:  No  What makes it worse:  Activities    He eats 2-3 servings of fruits and vegetables daily.He consumes 0 sweetened beverage(s) daily.He exercises with enough effort to increase his heart rate 30 to 60 minutes per day.  He exercises with enough effort to increase his heart rate 7 days per week.   He is taking medications regularly.       Joint or Musculoskeletal Pain  Duration of complaint: 3 months    Description:   Location: left foot   Character: Dull ache  Intensity: moderate  Progression of Symptoms: intermittent  Accompanying Signs & Symptoms: Other symptoms: none  History: Previous similar pain: no    Precipitating factors: Trauma or overuse: no  Alleviating factors: Improved by: rest/inactivity  Therapies Tried and outcome: played badCorrigoen and then when got up in the morning could hardly walk in the left foot     Labs reviewed in EPIC  BP Readings from Last 3 Encounters:   04/22/22 134/87   02/22/22 124/82   07/29/19 126/85    Wt Readings from Last 3 Encounters:   04/22/22 75.4 kg (166 lb 4.8 oz)   02/22/22 75.3 kg (166 lb 1.6 oz)   07/29/19 71.9 kg (158 lb 8 oz)                  Patient Active Problem List   Diagnosis    Chronic neck pain     Screen for colon cancer    Neck muscle spasm    Prediabetes    Blurred vision     No past surgical history on file.    Social History     Tobacco Use    Smoking status: Former    Smokeless tobacco: Never   Substance Use Topics    Alcohol use: Yes     Family History   Problem Relation Age of Onset    Diabetes Maternal Grandmother     Diabetes Maternal Grandfather     Heart Disease Paternal Uncle     Diabetes Maternal Aunt          Current Outpatient Medications   Medication Sig Dispense Refill    acetaminophen (TYLENOL) 500 MG tablet Take 500-1,000 mg by mouth every 6 hours as needed for mild pain      CYANOCOBALAMIN PO Take 1,000 mcg by mouth daily (Patient not taking: Reported on 2/22/2022)      cyclobenzaprine (FLEXERIL) 10 MG tablet Take 1 tablet (10 mg) by mouth nightly as needed for muscle spasms (Patient not taking: Reported on 2/22/2022) 20 tablet 0    diclofenac (VOLTAREN) 75 MG EC tablet Take 1 tablet (75 mg) by mouth 2 times daily as needed for moderate pain (Patient not taking: Reported on 4/22/2022) 30 tablet 0    metaxalone (SKELAXIN) 800 MG tablet Take 1 tablet (800 mg) by mouth 2 times daily as needed for moderate pain (Patient not taking: Reported on 4/22/2022) 30 tablet 0    naproxen (NAPROSYN) 500 MG tablet Take 1 tablet (500 mg) by mouth 2 times daily as needed for moderate pain (Patient not taking: Reported on 2/22/2022) 20 tablet 0     No Known Allergies        Review of Systems   Constitutional, HEENT, cardiovascular, pulmonary, gi and gu systems are negative, except as otherwise noted.      Objective    Vitals - Patient Reported  Pain Score: No Pain (0)        Physical Exam   GENERAL: Healthy, alert and no distress  EYES: Eyes grossly normal to inspection and conjunctivae and sclerae normal  HENT: normal cephalic/atraumatic  RESP: No audible wheeze, cough, or visible cyanosis.  No visible retractions or increased work of breathing.    MS: No gross musculoskeletal defects noted.  Normal range  of motion.  No visible edema.  SKIN: Visible skin clear. No significant rash, abnormal pigmentation or lesions.  NEURO: mentation intact  PSYCH: Mentation appears normal, affect normal/bright, judgement and insight intact, normal speech and appearance well-groomed.    Pending orders and results     Assessment & Plan     Left foot pain  Will follow up and/or notify patient of  results via My Chart to determine further need for followup  Will rule out possible stress fracture   - XR Foot Left G/E 3 Views  - naproxen (NAPROSYN) 500 MG tablet  Dispense: 30 tablet; Refill: 1  - Orthopedic  Referral    Plantar fasciitis of left foot  Discussed pathophysiology of this condition and implications.  Questions answered. Superfeet insoles recommended. Good walking shoes or running shoes recommended for activities.  Minimize high-impact activities. Stretching twice daily. Ice after activity.   Will Start:  - naproxen (NAPROSYN) 500 MG tablet  Dispense: 30 tablet; Refill: 1  - Orthopedic  Referral      Ordering of each unique test  Prescription drug management   Time spent by me doing chart review, history and exam, documentation and further activities per the note       See Patient Instructions  Patient Instructions   PLAN:   1.   Symptomatic therapy suggested:   referral to Orthopedics for this injury, prescription for NSAID given    2.  Orders Placed This Encounter   Medications    naproxen (NAPROSYN) 500 MG tablet     Sig: Take 1 tablet (500 mg) by mouth 2 times daily as needed     Dispense:  30 tablet     Refill:  1     Orders Placed This Encounter   Procedures    REVIEW OF HEALTH MAINTENANCE PROTOCOL ORDERS    XR Foot Left G/E 3 Views    Orthopedic  Referral       3.  FURTHER TESTING:       - xray foot   Referral to podiatry    Patient needs to follow up in if no improvement,or sooner if worsening of symptoms or other symptoms develop.         CARMELLA Gibbs [facility]  [facility]          Video-Visit Details    Type of service:  Video Visit     Originating Location (pt. Location): Home    Distant Location (provider location):  On-site  Platform used for Video Visit: Elizabeth

## 2024-04-19 ENCOUNTER — OFFICE VISIT (OUTPATIENT)
Dept: OPHTHALMOLOGY | Facility: CLINIC | Age: 49
End: 2024-04-19
Payer: COMMERCIAL

## 2024-04-19 DIAGNOSIS — H52.4 PRESBYOPIA: ICD-10-CM

## 2024-04-19 DIAGNOSIS — H43.391 VITREOUS FLOATER, RIGHT: Primary | ICD-10-CM

## 2024-04-19 PROCEDURE — 92004 COMPRE OPH EXAM NEW PT 1/>: CPT | Performed by: OPTOMETRIST

## 2024-04-19 PROCEDURE — 92015 DETERMINE REFRACTIVE STATE: CPT | Performed by: OPTOMETRIST

## 2024-04-19 RX ORDER — PILOCARPINE HYDROCHLORIDE 12.5 MG/ML
1 SOLUTION/ DROPS OPHTHALMIC 2 TIMES DAILY
Qty: 2.5 ML | Refills: 11 | Status: SHIPPED | OUTPATIENT
Start: 2024-04-19 | End: 2024-05-19

## 2024-04-19 ASSESSMENT — CONF VISUAL FIELD
OS_INFERIOR_TEMPORAL_RESTRICTION: 0
OD_INFERIOR_NASAL_RESTRICTION: 0
METHOD: COUNTING FINGERS
OD_SUPERIOR_NASAL_RESTRICTION: 0
OD_INFERIOR_TEMPORAL_RESTRICTION: 0
OS_SUPERIOR_NASAL_RESTRICTION: 0
OD_NORMAL: 1
OD_SUPERIOR_TEMPORAL_RESTRICTION: 0
OS_INFERIOR_NASAL_RESTRICTION: 0
OS_NORMAL: 1
OS_SUPERIOR_TEMPORAL_RESTRICTION: 0

## 2024-04-19 ASSESSMENT — SLIT LAMP EXAM - LIDS
COMMENTS: NORMAL
COMMENTS: NORMAL

## 2024-04-19 ASSESSMENT — VISUAL ACUITY
OD_SC: 20/20
OD_SC: J2
METHOD: SNELLEN - LINEAR
OS_SC: J2
OS_SC: 20/20

## 2024-04-19 ASSESSMENT — CUP TO DISC RATIO
OD_RATIO: 0.2
OS_RATIO: 0.2

## 2024-04-19 ASSESSMENT — TONOMETRY
OS_IOP_MMHG: 16
OD_IOP_MMHG: 14
IOP_METHOD: ICARE

## 2024-04-19 ASSESSMENT — EXTERNAL EXAM - RIGHT EYE: OD_EXAM: NORMAL

## 2024-04-19 ASSESSMENT — REFRACTION_MANIFEST
OD_ADD: +1.75
OD_CYLINDER: SPHERE
OS_SPHERE: PLANO
OS_CYLINDER: SPHERE
OS_ADD: +1.75
OD_SPHERE: PLANO

## 2024-04-19 ASSESSMENT — EXTERNAL EXAM - LEFT EYE: OS_EXAM: NORMAL

## 2024-04-19 NOTE — PROGRESS NOTES
Assessment/Plan  (H43.391) Vitreous floater, right  (primary encounter diagnosis)  Comment: Longstanding, becoming less noticeable.   Plan: Return to clinic with worsening flashes, floaters, or curtain over vision. Monitor regularly.     (H52.4) Presbyopia  Comment: Prior single vision lenses did not work well.   Plan: Pilocarpine HCl (VUITY) 1.25 % SOLN       Rx for computer progressive lenses dispensed to consider trying. Patient may opt to try Vuity 1-2 times daily in both eyes first for presbyopia symptoms.       Complete documentation of historical and exam elements from today's encounter can  be found in the full encounter summary report (not reduplicated in this progress  note). I personally obtained the chief complaint(s) and history of present illness. I  confirmed and edited as necessary the review of systems, past medical/surgical  history, family history, social history, and examination findings as documented by  others; and I examined the patient myself. I personally reviewed the relevant tests,  images, and reports as documented above. I formulated and edited as necessary the  assessment and plan and discussed the findings and management plan with the  patient and family.    Afshin Michaels, OD

## 2024-04-19 NOTE — NURSING NOTE
Chief Complaints and History of Present Illnesses   Patient presents with    Blurred Vision Evaluation       Chief Complaint(s) and History of Present Illness(es)       Blurred Vision Evaluation              Laterality: both eyes    Context: mid-range vision and near vision              Comments    Patient here today for comprehensive eye exam. Pt states his reading and computer vision is blurry, no concerns at distance. Has tried glasses in the past but was not comfortable with them. Occasional AT use. Notes black dot in right eye vision x 1 year, was larger when it first appeared than it is now.                    Tricia Esqueda, COT

## 2024-05-07 SDOH — HEALTH STABILITY: PHYSICAL HEALTH: ON AVERAGE, HOW MANY DAYS PER WEEK DO YOU ENGAGE IN MODERATE TO STRENUOUS EXERCISE (LIKE A BRISK WALK)?: 5 DAYS

## 2024-05-07 SDOH — HEALTH STABILITY: PHYSICAL HEALTH: ON AVERAGE, HOW MANY MINUTES DO YOU ENGAGE IN EXERCISE AT THIS LEVEL?: 30 MIN

## 2024-05-07 ASSESSMENT — SOCIAL DETERMINANTS OF HEALTH (SDOH): HOW OFTEN DO YOU GET TOGETHER WITH FRIENDS OR RELATIVES?: ONCE A WEEK

## 2024-05-10 ENCOUNTER — OFFICE VISIT (OUTPATIENT)
Dept: FAMILY MEDICINE | Facility: CLINIC | Age: 49
End: 2024-05-10
Payer: COMMERCIAL

## 2024-05-10 ENCOUNTER — ORDERS ONLY (AUTO-RELEASED) (OUTPATIENT)
Dept: FAMILY MEDICINE | Facility: CLINIC | Age: 49
End: 2024-05-10

## 2024-05-10 VITALS
HEIGHT: 67 IN | OXYGEN SATURATION: 99 % | DIASTOLIC BLOOD PRESSURE: 82 MMHG | SYSTOLIC BLOOD PRESSURE: 126 MMHG | TEMPERATURE: 97.6 F | WEIGHT: 161 LBS | HEART RATE: 86 BPM | RESPIRATION RATE: 15 BRPM | BODY MASS INDEX: 25.27 KG/M2

## 2024-05-10 DIAGNOSIS — Z13.220 SCREENING FOR HYPERLIPIDEMIA: ICD-10-CM

## 2024-05-10 DIAGNOSIS — G89.29 CHRONIC NECK PAIN: ICD-10-CM

## 2024-05-10 DIAGNOSIS — M54.6 MIDLINE THORACIC BACK PAIN, UNSPECIFIED CHRONICITY: ICD-10-CM

## 2024-05-10 DIAGNOSIS — M54.2 CHRONIC NECK PAIN: ICD-10-CM

## 2024-05-10 DIAGNOSIS — Z12.11 SCREEN FOR COLON CANCER: ICD-10-CM

## 2024-05-10 DIAGNOSIS — L91.8 SKIN TAG: ICD-10-CM

## 2024-05-10 DIAGNOSIS — L65.9 HAIR LOSS: ICD-10-CM

## 2024-05-10 DIAGNOSIS — Z00.00 ROUTINE GENERAL MEDICAL EXAMINATION AT A HEALTH CARE FACILITY: Primary | ICD-10-CM

## 2024-05-10 DIAGNOSIS — L81.9 CHANGE IN PIGMENTED SKIN LESION OF FACE: ICD-10-CM

## 2024-05-10 DIAGNOSIS — Z13.0 SCREENING FOR DEFICIENCY ANEMIA: ICD-10-CM

## 2024-05-10 DIAGNOSIS — M62.838 NECK MUSCLE SPASM: ICD-10-CM

## 2024-05-10 DIAGNOSIS — Z13.1 SCREENING FOR DIABETES MELLITUS (DM): ICD-10-CM

## 2024-05-10 PROCEDURE — 99214 OFFICE O/P EST MOD 30 MIN: CPT | Mod: 25 | Performed by: FAMILY MEDICINE

## 2024-05-10 PROCEDURE — 90746 HEPB VACCINE 3 DOSE ADULT IM: CPT | Performed by: FAMILY MEDICINE

## 2024-05-10 PROCEDURE — 90715 TDAP VACCINE 7 YRS/> IM: CPT | Performed by: FAMILY MEDICINE

## 2024-05-10 PROCEDURE — 99396 PREV VISIT EST AGE 40-64: CPT | Mod: 25 | Performed by: FAMILY MEDICINE

## 2024-05-10 PROCEDURE — 90471 IMMUNIZATION ADMIN: CPT | Performed by: FAMILY MEDICINE

## 2024-05-10 PROCEDURE — 90472 IMMUNIZATION ADMIN EACH ADD: CPT | Performed by: FAMILY MEDICINE

## 2024-05-10 RX ORDER — DICLOFENAC SODIUM 75 MG/1
75 TABLET, DELAYED RELEASE ORAL 2 TIMES DAILY PRN
Qty: 60 TABLET | Refills: 0 | Status: SHIPPED | OUTPATIENT
Start: 2024-05-10

## 2024-05-10 RX ORDER — METAXALONE 800 MG/1
800 TABLET ORAL 2 TIMES DAILY PRN
Qty: 60 TABLET | Refills: 0 | Status: SHIPPED | OUTPATIENT
Start: 2024-05-10

## 2024-05-10 ASSESSMENT — PAIN SCALES - GENERAL: PAINLEVEL: MODERATE PAIN (5)

## 2024-05-10 NOTE — PROGRESS NOTES
Prior to immunization administration, verified patients identity using patient s name and date of birth. Please see Immunization Activity for additional information.     Screening Questionnaire for Adult Immunization    Are you sick today?   No   Do you have allergies to medications, food, a vaccine component or latex?   No   Have you ever had a serious reaction after receiving a vaccination?   No   Do you have a long-term health problem with heart, lung, kidney, or metabolic disease (e.g., diabetes), asthma, a blood disorder, no spleen, complement component deficiency, a cochlear implant, or a spinal fluid leak?  Are you on long-term aspirin therapy?   No   Do you have cancer, leukemia, HIV/AIDS, or any other immune system problem?   No   Do you have a parent, brother, or sister with an immune system problem?   No   In the past 3 months, have you taken medications that affect  your immune system, such as prednisone, other steroids, or anticancer drugs; drugs for the treatment of rheumatoid arthritis, Crohn s disease, or psoriasis; or have you had radiation treatments?   No   Have you had a seizure, or a brain or other nervous system problem?   No   During the past year, have you received a transfusion of blood or blood    products, or been given immune (gamma) globulin or antiviral drug?   No   For women: Are you pregnant or is there a chance you could become       pregnant during the next month?   No   Have you received any vaccinations in the past 4 weeks?   No     Immunization questionnaire answers were all negative.      Patient instructed to remain in clinic for 15 minutes afterwards, and to report any adverse reactions.     Screening performed by Lili Hou MA on 5/10/2024 at 1:46 PM.

## 2024-05-10 NOTE — PATIENT INSTRUCTIONS
"Preventive Care Advice   This is general advice we often give to help people stay healthy. Your care team may have specific advice just for you. Please talk to your care team about your own preventive care needs.  Lifestyle  Exercise at least 150 minutes each week (30 minutes a day, 5 days a week).  Do muscle strengthening activities 2 days a week. These help control your weight and prevent disease.  No smoking.  Wear sunscreen to prevent skin cancer.  Have your home tested for radon every 2 to 5 years. Radon is a colorless, odorless gas that can harm your lungs. To learn more, go to www.health.[facility].mn. and search for \"Radon in Homes.\"  Keep guns unloaded and locked up in a safe place like a safe or gun vault, or, use a gun lock and hide the keys. Always lock away bullets separately. To learn more, visit [employer].[place].gov and search for \"safe gun storage.\"  Nutrition  Eat 5 or more servings of fruits and vegetables each day.  Try wheat bread, brown rice and whole grain pasta (instead of white bread, rice, and pasta).  Get enough calcium and vitamin D. Check the label on foods and aim for 100% of the RDA (recommended daily allowance).  Regular exams  Have a dental exam and cleaning every 6 months.  See your health care team every year to talk about:  Any changes in your health.  Any medicines your care team has prescribed.  Preventive care, family planning, and ways to prevent chronic diseases.  Shots (vaccines)   HPV shots (up to age 26), if you've never had them before.  Hepatitis B shots (up to age 59), if you've never had them before.  COVID-19 shot: Get this shot when it's due.  Flu shot: Get a flu shot every year.  Tetanus shot: Get a tetanus shot every 10 years.  Pneumococcal, hepatitis A, and RSV shots: Ask your care team if you need these based on your risk.  Shingles shot (for age 50 and up).  General health tests  Diabetes screening:  Starting at age 35, Get screened for diabetes at least every 3 years.  If " you are younger than age 35, ask your care team if you should be screened for diabetes.  Cholesterol test: At age 39, start having a cholesterol test every 5 years, or more often if advised.  Bone density scan (DEXA): At age 50, ask your care team if you should have this scan for osteoporosis (brittle bones).  Hepatitis C: Get tested at least once in your life.  Abdominal aortic aneurysm screening: Talk to your doctor about having this screening if you:  Have ever smoked; and  Are biologically male; and  Are between the ages of 65 and 75.  STIs (sexually transmitted infections)  Before age 24: Ask your care team if you should be screened for STIs.  After age 24: Get screened for STIs if you're at risk. You are at risk for STIs (including HIV) if:  You are sexually active with more than one person.  You don't use condoms every time.  You or a partner was diagnosed with a sexually transmitted infection.  If you are at risk for HIV, ask about PrEP medicine to prevent HIV.  Get tested for HIV at least once in your life, whether you are at risk for HIV or not.  Cancer screening tests  Cervical cancer screening: If you have a cervix, begin getting regular cervical cancer screening tests at age 21. Most people who have regular screenings with normal results can stop after age 65. Talk about this with your provider.  Breast cancer scan (mammogram): If you've ever had breasts, begin having regular mammograms starting at age 40. This is a scan to check for breast cancer.  Colon cancer screening: It is important to start screening for colon cancer at age 45.  Have a colonoscopy test every 10 years (or more often if you're at risk) Or, ask your provider about stool tests like a FIT test every year or Cologuard test every 3 years.  To learn more about your testing options, visit: www.[employer]/145171.pdf.  For help making a decision, visit: monroe/fz64194.  Prostate cancer screening test: If you have a prostate and are age 55  to 69, ask your provider if you would benefit from a yearly prostate cancer screening test.  Lung cancer screening: If you are a current or former smoker age 50 to 80, ask your care team if ongoing lung cancer screenings are right for you.  For informational purposes only. Not to replace the advice of your health care provider. Copyright   2023 [employer]. All rights reserved. Clinically reviewed by the [facility]. [employer] 471403 - REV 04/24.    Learning About Stress  What is stress?     Stress is your body's response to a hard situation. Your body can have a physical, emotional, or mental response. Stress is a fact of life for most people, and it affects everyone differently. What causes stress for you may not be stressful for someone else.  A lot of things can cause stress. You may feel stress when you go on a job interview, take a test, or run a race. This kind of short-term stress is normal and even useful. It can help you if you need to work hard or react quickly. For example, stress can help you finish an important job on time.  Long-term stress is caused by ongoing stressful situations or events. Examples of long-term stress include long-term health problems, ongoing problems at work, or conflicts in your family. Long-term stress can harm your health.  How does stress affect your health?  When you are stressed, your body responds as though you are in danger. It makes hormones that speed up your heart, make you breathe faster, and give you a burst of energy. This is called the fight-or-flight stress response. If the stress is over quickly, your body goes back to normal and no harm is done.  But if stress happens too often or lasts too long, it can have bad effects. Long-term stress can make you more likely to get sick, and it can make symptoms of some diseases worse. If you tense up when you are stressed, you may develop neck, shoulder, or low back pain. Stress is  linked to high blood pressure and heart disease.  Stress also harms your emotional health. It can make you moore, tense, or depressed. Your relationships may suffer, and you may not do well at work or school.  What can you do to manage stress?  You can try these things to help manage stress:   Do something active. Exercise or activity can help reduce stress. Walking is a great way to get started. Even everyday activities such as housecleaning or yard work can help.  Try yoga or alejandro chi. These techniques combine exercise and meditation. You may need some training at first to learn them.  Do something you enjoy. For example, listen to music or go to a movie. Practice your hobby or do volunteer work.  Meditate. This can help you relax, because you are not worrying about what happened before or what may happen in the future.  Do guided imagery. Imagine yourself in any setting that helps you feel calm. You can use online videos, books, or a teacher to guide you.  Do breathing exercises. For example:  From a standing position, bend forward from the waist with your knees slightly bent. Let your arms dangle close to the floor.  Breathe in slowly and deeply as you return to a standing position. Roll up slowly and lift your head last.  Hold your breath for just a few seconds in the standing position.  Breathe out slowly and bend forward from the waist.  Let your feelings out. Talk, laugh, cry, and express anger when you need to. Talking with supportive friends or family, a counselor, or a vicki leader about your feelings is a healthy way to relieve stress. Avoid discussing your feelings with people who make you feel worse.  Write. It may help to write about things that are bothering you. This helps you find out how much stress you feel and what is causing it. When you know this, you can find better ways to cope.  What can you do to prevent stress?  You might try some of these things to help prevent stress:  Manage your time.  "This helps you find time to do the things you want and need to do.  Get enough sleep. Your body recovers from the stresses of the day while you are sleeping.  Get support. Your family, friends, and community can make a difference in how you experience stress.  Limit your news feed. Avoid or limit time on social media or news that may make you feel stressed.  Do something active. Exercise or activity can help reduce stress. Walking is a great way to get started.  Where can you learn more?  Go to https://www.[employer].net/patiented  Enter N032 in the search box to learn more about \"Learning About Stress.\"  Current as of: October 24, 2023               Content Version: 14.0    0701-7486 [employer].   Care instructions adapted under license by your healthcare professional. If you have questions about a medical condition or this instruction, always ask your healthcare professional. [employer] disclaims any warranty or liability for your use of this information.      "

## 2024-05-10 NOTE — PROGRESS NOTES
Preventive Care Visit  [facility]  Su Pisano MD, Family Medicine  May 10, 2024      Assessment & Plan     Routine general medical examination at a health care facility  : Discussed on regular exercises, healthy eating,and routine dental checks.    Chronic neck pain  With reoccurring episodes of pain since 2019 likely from improper posture of the neck and back while sitting and working,  Recommended to follow proper posture, education handouts given on exercises to do at home recommended to start on physical therapy  Refills given for diclofenac and Skelaxin to take twice daily as needed  If no improvement noted in 3 months, consider sports medicine consult for further evaluation.  Reviewed cervical spine x-ray from 2019 showing minimal degenerative changes    - metaxalone (SKELAXIN) 800 MG tablet; Take 1 tablet (800 mg) by mouth 2 times daily as needed for moderate pain  - diclofenac (VOLTAREN) 75 MG EC tablet; Take 1 tablet (75 mg) by mouth 2 times daily as needed for moderate pain  - Physical Therapy  Referral; Future    Midline thoracic back pain, unspecified chronicity  as above  - metaxalone (SKELAXIN) 800 MG tablet; Take 1 tablet (800 mg) by mouth 2 times daily as needed for moderate pain  - diclofenac (VOLTAREN) 75 MG EC tablet; Take 1 tablet (75 mg) by mouth 2 times daily as needed for moderate pain  - Physical Therapy  Referral; Future      Neck muscle spasm  - metaxalone (SKELAXIN) 800 MG tablet; Take 1 tablet (800 mg) by mouth 2 times daily as needed for moderate pain  - diclofenac (VOLTAREN) 75 MG EC tablet; Take 1 tablet (75 mg) by mouth 2 times daily as needed for moderate pain  - Physical Therapy  Referral; Future    Screen for colon cancer    - COLOGCORBY(EX[employer] SCIENCES); Future    Screening for deficiency anemia    - CBC with platelets; Future    Screening for diabetes mellitus (DM)    - Comprehensive metabolic panel (BMP + Alb, Alk Phos,  "ALT, AST, Total. Bili, TP); Future    Screening for hyperlipidemia    - Lipid panel reflex to direct LDL Fasting; Future    Change in pigmented skin lesion of face  Ddx-solar lentigo and seborrheic keratosis per patient's request, dermatology referral placed, emphasized on sunscreen use    - Adult Dermatology  Referral; Future    Skin tag  Around the neck  - Adult Dermatology  Referral; Future    Hair loss  Ddx-likely male pattern baldness given the family history/screen for deficiencies in vitamin D, B12 and ferritin  - Adult Dermatology  Referral; Future  - Vitamin D Deficiency; Future  - Vitamin B12; Future  - Ferritin; Future      Review of the result(s) of each unique test - CBC, CMP, TSH        BMI  Estimated body mass index is 25.22 kg/m  as calculated from the following:    Height as of this encounter: 1.702 m (5' 7\").    Weight as of this encounter: 73 kg (161 lb).       Counseling  Appropriate preventive services were discussed with this patient, including applicable screening as appropriate for fall prevention, nutrition, physical activity, Tobacco-use cessation, weight loss and cognition.  Checklist reviewing preventive services available has been given to the patient.  Reviewed patient's diet, addressing concerns and/or questions.   He is at risk for psychosocial distress and has been provided with information to reduce risk.       Chart documentation done in part with Dragon Voice recognition Software. Although reviewed after completion, some word and grammatical error may remain.    See Patient Instructions    Kiet Childers is a 49 year old, presenting for the following:  Physical        5/10/2024     1:00 PM   Additional Questions   Roomed by Lili   Accompanied by self         5/10/2024     1:00 PM   Patient Reported Additional Medications   Patient reports taking the following new medications no        Health Care Directive  Patient does not have a Health Care " Directive or Living Will: Discussed advance care planning with patient; however, patient declined at this time.    HPI        Neck and back pain-patient continues to have intermittent pain and stiffness of the neck and back mid back muscles associated with no radiation of pain to the upper or lower extremities, no focal neurological symptoms including tingling, numbness or weakness of the extremities, bladder or bowel symptoms.  Denies history of fall or trauma to the neck and back   denies chronic headaches,          5/7/2024   General Health   How would you rate your overall physical health? Good   Feel stress (tense, anxious, or unable to sleep) Only a little   (!) STRESS CONCERN      5/7/2024   Nutrition   Three or more servings of calcium each day? Yes   Diet: Regular (no restrictions)   How many servings of fruit and vegetables per day? (!) 2-3   How many sweetened beverages each day? 0-1         5/7/2024   Exercise   Days per week of moderate/strenous exercise 5 days   Average minutes spent exercising at this level 30 min         5/7/2024   Social Factors   Frequency of gathering with friends or relatives Once a week   Worry food won't last until get money to buy more No   Food not last or not have enough money for food? No   Do you have housing?  Yes   Are you worried about losing your housing? No   Lack of transportation? No   Unable to get utilities (heat,electricity)? No         5/7/2024   Dental   Dentist two times every year? Yes         5/7/2024   TB Screening   Were you born outside of the [place]? Yes         Today's PHQ-2 Score:       5/10/2024    12:52 PM   PHQ-2 ( 1999 [employer])   Q1: Little interest or pleasure in doing things 0   Q2: Feeling down, depressed or hopeless 1   PHQ-2 Score 1   Q1: Little interest or pleasure in doing things Not at all   Q2: Feeling down, depressed or hopeless Several days   PHQ-2 Score 1           5/7/2024   Substance Use   Alcohol more than 3/day or more than 7/wk No   Do  you use any other substances recreationally? No     Social History     Tobacco Use    Smoking status: Former     Types: Cigars     Passive exposure: Past    Smokeless tobacco: Never   Vaping Use    Vaping status: Never Used   Substance Use Topics    Alcohol use: Not Currently     Comment: Occasionally    Drug use: No           5/7/2024   STI Screening   New sexual partner(s) since last STI/HIV test? No   ASCVD Risk   The 10-year ASCVD risk score (Monty GUAMAN, et al., 2019) is: 2.9%    Values used to calculate the score:      Age: 49 years      Sex: Male      Is Non- : No      Diabetic: No      Tobacco smoker: No      Systolic Blood Pressure: 126 mmHg      Is BP treated: No      HDL Cholesterol: 43 mg/dL      Total Cholesterol: 184 mg/dL        5/7/2024   Contraception/Family Planning   Questions about contraception or family planning No        Reviewed and updated as needed this visit by Provider                    No past medical history on file.  No past surgical history on file.  Lab work is in process  Labs reviewed in EPIC  BP Readings from Last 3 Encounters:   05/10/24 126/82   04/22/22 134/87   02/22/22 124/82    Wt Readings from Last 3 Encounters:   05/10/24 73 kg (161 lb)   04/22/22 75.4 kg (166 lb 4.8 oz)   02/22/22 75.3 kg (166 lb 1.6 oz)                  Patient Active Problem List   Diagnosis    Chronic neck pain    Screen for colon cancer    Neck muscle spasm    Prediabetes    Blurred vision    Skin tag    Hair loss     No past surgical history on file.    Social History     Tobacco Use    Smoking status: Former     Types: Cigars     Passive exposure: Past    Smokeless tobacco: Never   Substance Use Topics    Alcohol use: Not Currently     Comment: Occasionally     Family History   Problem Relation Age of Onset    Diabetes Maternal Grandmother     Diabetes Maternal Grandfather     Heart Disease Paternal Uncle     Diabetes Maternal Aunt          Current Outpatient  "Medications   Medication Sig Dispense Refill    acetaminophen (TYLENOL) 500 MG tablet Take 500-1,000 mg by mouth every 6 hours as needed for mild pain      CYANOCOBALAMIN PO Take 1,000 mcg by mouth daily      diclofenac (VOLTAREN) 75 MG EC tablet Take 1 tablet (75 mg) by mouth 2 times daily as needed for moderate pain 60 tablet 0    metaxalone (SKELAXIN) 800 MG tablet Take 1 tablet (800 mg) by mouth 2 times daily as needed for moderate pain 60 tablet 0    Pilocarpine HCl (VUITY) 1.25 % SOLN Place 1 drop into both eyes 2 times daily for 30 days 2.5 mL 11     No Known Allergies  Recent Labs   Lab Test 04/20/22  0905   A1C 5.8*   *   HDL 43   TRIG 147   ALT 47   CR 0.78   GFRESTIMATED >90   POTASSIUM 4.1   TSH 0.94          Review of Systems  CONSTITUTIONAL: NEGATIVE for fever, chills, change in weight  INTEGUMENTARY/SKIN: Hair thinning and hair loss, skin tag and pigmented skin lesion on the face  EYES: NEGATIVE for vision changes or irritation  ENT/MOUTH: NEGATIVE for ear, mouth and throat problems  RESP: NEGATIVE for significant cough or SOB  BREAST: NEGATIVE for masses, tenderness or discharge  CV: NEGATIVE for chest pain, palpitations or peripheral edema  GI: NEGATIVE for nausea, abdominal pain, heartburn, or change in bowel habits  : NEGATIVE for frequency, dysuria, or hematuria  MUSCULOSKELETAL: As above  NEURO: NEGATIVE for weakness, dizziness or paresthesias  ENDOCRINE: NEGATIVE for temperature intolerance, skin/hair changes  HEME: NEGATIVE for bleeding problems  PSYCHIATRIC: NEGATIVE for changes in mood or affect     Objective    Exam  /82 (BP Location: Right arm, Patient Position: Sitting, Cuff Size: Adult Regular)   Pulse 86   Temp 97.6  F (36.4  C) (Temporal)   Resp 15   Ht 1.702 m (5' 7\")   Wt 73 kg (161 lb)   SpO2 99%   BMI 25.22 kg/m     Estimated body mass index is 25.22 kg/m  as calculated from the following:    Height as of this encounter: 1.702 m (5' 7\").    Weight as of this " encounter: 73 kg (161 lb).    Physical Exam  GENERAL: alert and no distress  EYES: Eyes grossly normal to inspection, PERRL and conjunctivae and sclerae normal  HENT: ear canals and TM's normal, nose and mouth without ulcers or lesions  NECK: no adenopathy, no asymmetry, masses, or scars  RESP: lungs clear to auscultation - no rales, rhonchi or wheezes  CV: regular rate and rhythm, normal S1 S2, no S3 or S4, no murmur, click or rub, no peripheral edema  ABDOMEN: soft, nontender, no hepatosplenomegaly, no masses and bowel sounds normal  MS: no gross musculoskeletal defects noted, no edema  SKIN: Multiple pigmented papulosis nigra on both temple areas  Skin tag on the left side of the neck  Solar lentigo on the face  Male pattern baldness on the scalp  NEURO: Normal strength and tone, mentation intact and speech normal  PSYCH: mentation appears normal, affect normal/bright        Signed Electronically by: Su Pisano MD

## 2024-05-24 ENCOUNTER — LAB (OUTPATIENT)
Dept: LAB | Facility: CLINIC | Age: 49
End: 2024-05-24
Payer: COMMERCIAL

## 2024-05-24 DIAGNOSIS — R79.89 ELEVATED FERRITIN: Primary | ICD-10-CM

## 2024-05-24 DIAGNOSIS — R73.01 ELEVATED FASTING GLUCOSE: ICD-10-CM

## 2024-05-24 DIAGNOSIS — Z13.220 SCREENING FOR HYPERLIPIDEMIA: ICD-10-CM

## 2024-05-24 DIAGNOSIS — Z13.0 SCREENING FOR DEFICIENCY ANEMIA: ICD-10-CM

## 2024-05-24 DIAGNOSIS — Z13.1 SCREENING FOR DIABETES MELLITUS (DM): ICD-10-CM

## 2024-05-24 DIAGNOSIS — L65.9 HAIR LOSS: ICD-10-CM

## 2024-05-24 LAB
ALBUMIN SERPL BCG-MCNC: 4.7 G/DL (ref 3.5–5.2)
ALP SERPL-CCNC: 57 U/L (ref 40–150)
ALT SERPL W P-5'-P-CCNC: 26 U/L (ref 0–70)
ANION GAP SERPL CALCULATED.3IONS-SCNC: 11 MMOL/L (ref 7–15)
AST SERPL W P-5'-P-CCNC: 20 U/L (ref 0–45)
BILIRUB SERPL-MCNC: 0.8 MG/DL
BUN SERPL-MCNC: 12.7 MG/DL (ref 6–20)
CALCIUM SERPL-MCNC: 9.4 MG/DL (ref 8.6–10)
CHLORIDE SERPL-SCNC: 102 MMOL/L (ref 98–107)
CHOLEST SERPL-MCNC: 188 MG/DL
CREAT SERPL-MCNC: 0.8 MG/DL (ref 0.67–1.17)
DEPRECATED HCO3 PLAS-SCNC: 26 MMOL/L (ref 22–29)
EGFRCR SERPLBLD CKD-EPI 2021: >90 ML/MIN/1.73M2
ERYTHROCYTE [DISTWIDTH] IN BLOOD BY AUTOMATED COUNT: 11.7 % (ref 10–15)
FASTING STATUS PATIENT QL REPORTED: YES
FASTING STATUS PATIENT QL REPORTED: YES
FERRITIN SERPL-MCNC: 697 NG/ML (ref 31–409)
GLUCOSE SERPL-MCNC: 104 MG/DL (ref 70–99)
HBA1C MFR BLD: 5.9 % (ref 0–5.6)
HCT VFR BLD AUTO: 46.6 % (ref 40–53)
HDLC SERPL-MCNC: 45 MG/DL
HGB BLD-MCNC: 15.7 G/DL (ref 13.3–17.7)
IRON BINDING CAPACITY (ROCHE): 307 UG/DL (ref 240–430)
IRON SATN MFR SERPL: 38 % (ref 15–46)
IRON SERPL-MCNC: 116 UG/DL (ref 61–157)
LDLC SERPL CALC-MCNC: 122 MG/DL
MCH RBC QN AUTO: 30.4 PG (ref 26.5–33)
MCHC RBC AUTO-ENTMCNC: 33.7 G/DL (ref 31.5–36.5)
MCV RBC AUTO: 90 FL (ref 78–100)
NONHDLC SERPL-MCNC: 143 MG/DL
PLATELET # BLD AUTO: 206 10E3/UL (ref 150–450)
POTASSIUM SERPL-SCNC: 4.2 MMOL/L (ref 3.4–5.3)
PROT SERPL-MCNC: 8.1 G/DL (ref 6.4–8.3)
RBC # BLD AUTO: 5.17 10E6/UL (ref 4.4–5.9)
SODIUM SERPL-SCNC: 139 MMOL/L (ref 135–145)
TRIGL SERPL-MCNC: 106 MG/DL
WBC # BLD AUTO: 6.1 10E3/UL (ref 4–11)

## 2024-05-24 PROCEDURE — 82607 VITAMIN B-12: CPT

## 2024-05-24 PROCEDURE — 83550 IRON BINDING TEST: CPT

## 2024-05-24 PROCEDURE — 85027 COMPLETE CBC AUTOMATED: CPT

## 2024-05-24 PROCEDURE — 80061 LIPID PANEL: CPT

## 2024-05-24 PROCEDURE — 82728 ASSAY OF FERRITIN: CPT

## 2024-05-24 PROCEDURE — 82306 VITAMIN D 25 HYDROXY: CPT

## 2024-05-24 PROCEDURE — 83036 HEMOGLOBIN GLYCOSYLATED A1C: CPT

## 2024-05-24 PROCEDURE — 80053 COMPREHEN METABOLIC PANEL: CPT

## 2024-05-24 PROCEDURE — 36415 COLL VENOUS BLD VENIPUNCTURE: CPT

## 2024-05-24 PROCEDURE — 83540 ASSAY OF IRON: CPT

## 2024-05-25 LAB
VIT B12 SERPL-MCNC: 882 PG/ML (ref 232–1245)
VIT D+METAB SERPL-MCNC: 28 NG/ML (ref 20–50)

## 2024-05-27 NOTE — RESULT ENCOUNTER NOTE
Talib Childers,  Your recent labs showed normal hemoglobin, liver test, kidney function, vitamin D and B12.  Your fasting blood sugar and A1c are slightly elevated consistent with prediabetes as before  Your fasting cholesterol numbers are within range for your age.  This is reassuring  Your iron levels-ferritin are moderately elevated.  Are you taking any iron supplements or other supplements containing iron?  I would recommend to recheck the labs on ferritin levels in a month to see the trend for further recommendations.  You can call for a lab only appointment to have this nonfasting lab done   Let me know if you have any questions. Take care.  Su Pisano MD

## 2024-05-30 LAB — NONINV COLON CA DNA+OCC BLD SCRN STL QL: NEGATIVE

## 2025-01-02 NOTE — PROGRESS NOTES
[facility] Dermatology Note  Encounter Date: Jan 6, 2025  Office Visit     Reviewed patients past medical history and pertinent chart review prior to patients visit today.     Dermatology Problem List:  SK   acrochordon    ____________________________    Assessment & Plan:     # seborrheic keratosis   # acrochordon  - Benign, no further treatment needed. Should lesion changed in size, color, shape or develop symptoms patient to return to clinic for re-evaluation.     Follow up: Return to clinic as needed for new or changing lesions     Filomena Leigh PA-C  [facility]  Dermatology    _______________________________________    CC: No chief complaint on file.    HPI:  Mr. Alvarado Spivey is a(n) 49 year old male who presents today as a new patient for evaluation of lesions of concern on the face and neck.  These lesions are new over the past couple months and years.  They are asymptomatic.  He is wondering if they are of concern.    Patient is otherwise feeling well, without additional skin concerns.      Physical Exam:  SKIN: Focused examination of face and neck was performed.  -face, waxy, stuck on tan/brown papules and patches  -neck, flesh colored, pedunculated papule(s)      - No other lesions of concern on areas examined.     Medications:  Current Outpatient Medications   Medication Sig Dispense Refill    acetaminophen (TYLENOL) 500 MG tablet Take 500-1,000 mg by mouth every 6 hours as needed for mild pain      CYANOCOBALAMIN PO Take 1,000 mcg by mouth daily      diclofenac (VOLTAREN) 75 MG EC tablet Take 1 tablet (75 mg) by mouth 2 times daily as needed for moderate pain 60 tablet 0    metaxalone (SKELAXIN) 800 MG tablet Take 1 tablet (800 mg) by mouth 2 times daily as needed for moderate pain 60 tablet 0     No current facility-administered medications for this visit.      Past Medical History:   Patient Active Problem List   Diagnosis    Chronic neck pain    Screen for colon  cancer    Neck muscle spasm    Prediabetes    Blurred vision    Skin tag    Hair loss     No past medical history on file.    CC Su Pisano MD  [address] [address] on close of this encounter.

## 2025-01-06 ENCOUNTER — OFFICE VISIT (OUTPATIENT)
Dept: DERMATOLOGY | Facility: CLINIC | Age: 50
End: 2025-01-06
Attending: FAMILY MEDICINE
Payer: COMMERCIAL

## 2025-01-06 DIAGNOSIS — L91.8 ACROCHORDON: ICD-10-CM

## 2025-01-06 DIAGNOSIS — L82.1 SEBORRHEIC KERATOSIS: ICD-10-CM

## 2025-01-06 PROCEDURE — 99202 OFFICE O/P NEW SF 15 MIN: CPT | Performed by: STUDENT IN AN ORGANIZED HEALTH CARE EDUCATION/TRAINING PROGRAM

## 2025-01-06 ASSESSMENT — PAIN SCALES - GENERAL: PAINLEVEL_OUTOF10: NO PAIN (0)

## 2025-01-06 NOTE — LETTER
1/6/2025      Alvarado Spivey  [address] [address]      Dear Colleague,    Thank you for referring your patient, Alvarado Spivey, to the [facility]. Please see a copy of my visit note below.    [facility] Dermatology Note  Encounter Date: Jan 6, 2025  Office Visit     Reviewed patients past medical history and pertinent chart review prior to patients visit today.     Dermatology Problem List:  SK   acrochordon    ____________________________    Assessment & Plan:     # seborrheic keratosis   # acrochordon  - Benign, no further treatment needed. Should lesion changed in size, color, shape or develop symptoms patient to return to clinic for re-evaluation.     Follow up: Return to clinic as needed for new or changing lesions     Filomena Leigh PA-C  [facility]  Dermatology    _______________________________________    CC: No chief complaint on file.    HPI:  Mr. Alvarado Spivey is a(n) 49 year old male who presents today as a new patient for evaluation of lesions of concern on the face and neck.  These lesions are new over the past couple months and years.  They are asymptomatic.  He is wondering if they are of concern.    Patient is otherwise feeling well, without additional skin concerns.      Physical Exam:  SKIN: Focused examination of face and neck was performed.  -face, waxy, stuck on tan/brown papules and patches  -neck, flesh colored, pedunculated papule(s)      - No other lesions of concern on areas examined.     Medications:  Current Outpatient Medications   Medication Sig Dispense Refill     acetaminophen (TYLENOL) 500 MG tablet Take 500-1,000 mg by mouth every 6 hours as needed for mild pain       CYANOCOBALAMIN PO Take 1,000 mcg by mouth daily       diclofenac (VOLTAREN) 75 MG EC tablet Take 1 tablet (75 mg) by mouth 2 times daily as needed for moderate pain 60 tablet 0     metaxalone (SKELAXIN) 800 MG tablet Take  1 tablet (800 mg) by mouth 2 times daily as needed for moderate pain 60 tablet 0     No current facility-administered medications for this visit.      Past Medical History:   Patient Active Problem List   Diagnosis     Chronic neck pain     Screen for colon cancer     Neck muscle spasm     Prediabetes     Blurred vision     Skin tag     Hair loss     No past medical history on file.    CC Su Pisano MD  [address] [place] [address] on close of this encounter.         Again, thank you for allowing me to participate in the care of your patient.        Sincerely,      Filomena Leigh PA-C    Electronically signed

## 2025-01-06 NOTE — NURSING NOTE
Alvarado Spivey's chief complaint for this visit includes:  Chief Complaint   Patient presents with    Skin Check     Pt here for lesions on face around the temples on face. Pt has noticed in 2016 the start of dark lesions forming on face, and has noticed a slow increase with that. There also is a skin tag on neck. No itching, burning, crusting,or bleeding. No personal or family hx of skin cancer     PCP: Su Pisano    Referring Provider:  Su Pisano MD  [address] [place] [address]    There were no vitals taken for this visit.  No Pain (0)      No Known Allergies      Do you need any medication refills at today's visit?    Mariza Cooper on 1/6/2025 at 3:21 PM

## 2025-04-10 ENCOUNTER — PATIENT OUTREACH (OUTPATIENT)
Dept: CARE COORDINATION | Facility: CLINIC | Age: 50
End: 2025-04-10
Payer: COMMERCIAL

## 2025-04-24 ENCOUNTER — PATIENT OUTREACH (OUTPATIENT)
Dept: CARE COORDINATION | Facility: CLINIC | Age: 50
End: 2025-04-24
Payer: COMMERCIAL

## 2025-05-06 ENCOUNTER — TELEPHONE (OUTPATIENT)
Dept: OPHTHALMOLOGY | Facility: CLINIC | Age: 50
End: 2025-05-06
Payer: COMMERCIAL

## 2025-05-06 NOTE — TELEPHONE ENCOUNTER
[facility] Call Center    Phone Message    May a detailed message be left on voicemail: yes     Reason for Call: Medication Refill Request    Has the patient contacted the pharmacy for the refill? Yes   Name of medication being requested: Vbity (pilocarpine)  Provider who prescribed the medication: Dr. Afshin Mueller.  Pharmacy: [facility] [address] [place] [address] [address] (Ph: [phone]).   Fax: [phone]  Date medication is needed: Called in on 4/27/25 and patient getting close to being out of eye drops.    Action Taken: Other: [facility]    Travel Screening: Not Applicable     Date of Service:

## 2025-05-07 DIAGNOSIS — H52.4 PRESBYOPIA: Primary | ICD-10-CM

## 2025-05-07 RX ORDER — PILOCARPINE HYDROCHLORIDE 12.5 MG/ML
1 SOLUTION/ DROPS OPHTHALMIC 2 TIMES DAILY PRN
Qty: 5 ML | Refills: 11 | Status: SHIPPED | OUTPATIENT
Start: 2025-05-07

## 2025-06-28 ENCOUNTER — HEALTH MAINTENANCE LETTER (OUTPATIENT)
Age: 50
End: 2025-06-28